# Patient Record
Sex: FEMALE | Race: WHITE | Employment: FULL TIME | URBAN - METROPOLITAN AREA
[De-identification: names, ages, dates, MRNs, and addresses within clinical notes are randomized per-mention and may not be internally consistent; named-entity substitution may affect disease eponyms.]

---

## 2018-10-03 PROBLEM — Z82.79 FAMILY HISTORY OF DOWN SYNDROME: Status: ACTIVE | Noted: 2018-10-03

## 2018-10-29 ENCOUNTER — HOSPITAL ENCOUNTER (EMERGENCY)
Age: 33
Discharge: HOME | End: 2018-10-30
Attending: EMERGENCY MEDICINE | Admitting: EMERGENCY MEDICINE
Payer: COMMERCIAL

## 2018-10-29 ENCOUNTER — APPOINTMENT (OUTPATIENT)
Dept: ULTRASOUND IMAGING | Age: 33
End: 2018-10-29
Attending: EMERGENCY MEDICINE
Payer: COMMERCIAL

## 2018-10-29 DIAGNOSIS — O03.9 COMPLETE ABORTION: Primary | ICD-10-CM

## 2018-10-29 LAB
ANION GAP SERPL CALC-SCNC: 12 MMOL/L (ref 3–18)
BASOPHILS # BLD: 0 K/UL (ref 0–0.1)
BASOPHILS NFR BLD: 0 % (ref 0–2)
BUN SERPL-MCNC: 10 MG/DL (ref 7–18)
BUN/CREAT SERPL: 17
CALCIUM SERPL-MCNC: 9 MG/DL (ref 8.5–10.1)
CHLORIDE SERPL-SCNC: 103 MMOL/L (ref 100–108)
CO2 SERPL-SCNC: 24 MMOL/L (ref 21–32)
CREAT SERPL-MCNC: 0.6 MG/DL (ref 0.6–1.3)
DIFFERENTIAL METHOD BLD: NORMAL
EOSINOPHIL # BLD: 0.2 K/UL (ref 0–0.4)
EOSINOPHIL NFR BLD: 2 % (ref 0–5)
ERYTHROCYTE [DISTWIDTH] IN BLOOD BY AUTOMATED COUNT: 12.3 % (ref 11.6–14.5)
GLUCOSE SERPL-MCNC: 105 MG/DL (ref 74–99)
HCG SERPL-ACNC: ABNORMAL MIU/ML (ref 0–10)
HCT VFR BLD AUTO: 43.3 % (ref 35–45)
HGB BLD-MCNC: 14.6 G/DL (ref 12–16)
LYMPHOCYTES # BLD: 2.6 K/UL (ref 0.9–3.6)
LYMPHOCYTES NFR BLD: 30 % (ref 21–52)
MCH RBC QN AUTO: 29.5 PG (ref 24–34)
MCHC RBC AUTO-ENTMCNC: 33.7 G/DL (ref 31–37)
MCV RBC AUTO: 87.5 FL (ref 74–97)
MONOCYTES # BLD: 0.7 K/UL (ref 0.05–1.2)
MONOCYTES NFR BLD: 8 % (ref 3–10)
NEUTS SEG # BLD: 5.3 K/UL (ref 1.8–8)
NEUTS SEG NFR BLD: 60 % (ref 40–73)
PLATELET # BLD AUTO: 204 K/UL (ref 135–420)
PMV BLD AUTO: 10.9 FL (ref 9.2–11.8)
POTASSIUM SERPL-SCNC: 3.8 MMOL/L (ref 3.5–5.5)
RBC # BLD AUTO: 4.95 M/UL (ref 4.2–5.3)
SODIUM SERPL-SCNC: 139 MMOL/L (ref 136–145)
WBC # BLD AUTO: 8.8 K/UL (ref 4.6–13.2)

## 2018-10-29 PROCEDURE — 80048 BASIC METABOLIC PNL TOTAL CA: CPT | Performed by: EMERGENCY MEDICINE

## 2018-10-29 PROCEDURE — 99285 EMERGENCY DEPT VISIT HI MDM: CPT

## 2018-10-29 PROCEDURE — 96375 TX/PRO/DX INJ NEW DRUG ADDON: CPT

## 2018-10-29 PROCEDURE — 74011250637 HC RX REV CODE- 250/637: Performed by: EMERGENCY MEDICINE

## 2018-10-29 PROCEDURE — 96374 THER/PROPH/DIAG INJ IV PUSH: CPT

## 2018-10-29 PROCEDURE — 74011250636 HC RX REV CODE- 250/636: Performed by: EMERGENCY MEDICINE

## 2018-10-29 PROCEDURE — 96361 HYDRATE IV INFUSION ADD-ON: CPT

## 2018-10-29 PROCEDURE — 84702 CHORIONIC GONADOTROPIN TEST: CPT | Performed by: EMERGENCY MEDICINE

## 2018-10-29 PROCEDURE — 93976 VASCULAR STUDY: CPT

## 2018-10-29 PROCEDURE — 85025 COMPLETE CBC W/AUTO DIFF WBC: CPT | Performed by: EMERGENCY MEDICINE

## 2018-10-29 PROCEDURE — 88305 TISSUE EXAM BY PATHOLOGIST: CPT | Performed by: OBSTETRICS & GYNECOLOGY

## 2018-10-29 RX ORDER — ONDANSETRON 2 MG/ML
4 INJECTION INTRAMUSCULAR; INTRAVENOUS
Status: COMPLETED | OUTPATIENT
Start: 2018-10-29 | End: 2018-10-29

## 2018-10-29 RX ORDER — KETOROLAC TROMETHAMINE 10 MG/1
10 TABLET, FILM COATED ORAL EVERY 8 HOURS
Qty: 15 TAB | Refills: 0 | Status: SHIPPED | OUTPATIENT
Start: 2018-10-29 | End: 2018-11-03

## 2018-10-29 RX ORDER — METHYLERGONOVINE MALEATE 0.2 MG/1
0.2 TABLET ORAL EVERY 6 HOURS
Qty: 4 TAB | Refills: 0 | Status: SHIPPED | OUTPATIENT
Start: 2018-10-29 | End: 2019-10-14

## 2018-10-29 RX ORDER — OXYCODONE AND ACETAMINOPHEN 5; 325 MG/1; MG/1
TABLET ORAL
Qty: 12 TAB | Refills: 0 | Status: SHIPPED | OUTPATIENT
Start: 2018-10-29 | End: 2018-10-29

## 2018-10-29 RX ORDER — MORPHINE SULFATE 4 MG/ML
4 INJECTION INTRAVENOUS
Status: COMPLETED | OUTPATIENT
Start: 2018-10-29 | End: 2018-10-29

## 2018-10-29 RX ORDER — MISOPROSTOL 100 UG/1
600 TABLET ORAL ONCE
Status: COMPLETED | OUTPATIENT
Start: 2018-10-29 | End: 2018-10-29

## 2018-10-29 RX ADMIN — SODIUM CHLORIDE 1000 ML: 900 INJECTION, SOLUTION INTRAVENOUS at 22:39

## 2018-10-29 RX ADMIN — ONDANSETRON 4 MG: 2 INJECTION INTRAMUSCULAR; INTRAVENOUS at 20:12

## 2018-10-29 RX ADMIN — MORPHINE SULFATE 4 MG: 4 INJECTION INTRAVENOUS at 20:12

## 2018-10-29 RX ADMIN — MISOPROSTOL 600 MCG: 100 TABLET ORAL at 23:12

## 2018-10-29 RX ADMIN — SODIUM CHLORIDE 1000 ML: 900 INJECTION, SOLUTION INTRAVENOUS at 20:14

## 2018-10-29 NOTE — ED TRIAGE NOTES
Pt arrives ambulatory to ED with c\o increased pain from miscarriage, pt sts she is scheduled for [facility]  tomorrow

## 2018-10-29 NOTE — ED PROVIDER NOTES
EMERGENCY DEPARTMENT HISTORY AND PHYSICAL EXAM 
 
Date: 10/29/2018 Patient Name: Darci Melara History of Presenting Illness Chief Complaint Patient presents with  Miscarriage History Provided By: Patient Chief Complaint: Abdominal Pain Duration: 4 Days Timing:  Worsening Location: Lower Quality: Aching and Cramping Severity: 10 out of 10 Modifying Factors: No modifying factors Associated Symptoms: vaginal bleeding, nausea Additional History (Context):  
7:43 PM 
Darci Melara is a 27 y.o. female with PMHX of miscarriage who presents to the emergency department C/O lower abdominal pain onset 4 hours ago. Associated sxs include vaginal bleeding, nausea. Pt states that she is scheduled for a D&C tomorrow with Dr. Michelle Jimenez. Pt states that 5 days ago she was dx with a miscarriage. Pt denies vomiting, fever, chills, and any other sxs or complaints. PCP: PHILIPP Mendez Current Facility-Administered Medications Medication Dose Route Frequency Provider Last Rate Last Dose  sodium chloride 0.9 % bolus infusion 1,000 mL  1,000 mL IntraVENous ONCE Vic Saini MD 1,000 mL/hr at 10/29/18 2239 1,000 mL at 10/29/18 2239 Current Outpatient Medications Medication Sig Dispense Refill  methylergonovine (METHERGINE) 0.2 mg tablet Take 1 Tab by mouth every six (6) hours. 4 Tab 0  
 oxyCODONE-acetaminophen (PERCOCET) 5-325 mg per tablet Take 1 tablet every 4-6 hours as needed for pain control. If you were instructed to try over the counter ibuprofen or tylenol, only take the percocet for pain not controlled with the over the counter medication. 12 Tab 0  
 ascorbate calcium (VITAMIN C PO) Take  by mouth.     
 benzonatate (TESSALON) 100 mg capsule TAKE 1 CAPSULE BY MOUTH THREE TIMES A DAY AS NEEDED FOR COUGH  0  
 nitrofurantoin, macrocrystal-monohydrate, (MACROBID) 100 mg capsule TAKE 1 CAPSULE BY MOUTH EVERY 12 HOURS WITH FOOD  0  
  Cetirizine (ZYRTEC) 10 mg cap Take  by mouth.  ibuprofen (MOTRIN) 800 mg tablet Take 1 Tab by mouth every eight (8) hours as needed (Pain level 4-6). 30 Tab 1  
 PRENATAL VIT37/IRON/FOLIC ACID (PRENATA PO) Take  by mouth. Past History Past Medical History: 
Past Medical History:  
Diagnosis Date  Miscarriage Past Surgical History: 
Past Surgical History:  
Procedure Laterality Date  HX  SECTION   Family History: 
Family History Problem Relation Age of Onset  Diabetes Other  Breast Cancer Paternal Aunt Social History: 
Social History Tobacco Use  Smoking status: Never Smoker  Smokeless tobacco: Never Used Substance Use Topics  Alcohol use: No  
 Drug use: No  
 
 
Allergies: Allergies Allergen Reactions  Latex, Natural Rubber Hives  Codeine Unknown (comments) Unsure projectile vomit or hives  Sulfa (Sulfonamide Antibiotics) Other (comments) Unknown projectile vomiting or hives Review of Systems Review of Systems Constitutional: Negative for chills and fever. Gastrointestinal: Positive for abdominal pain and nausea. Negative for vomiting. Genitourinary: Positive for vaginal bleeding. All other systems reviewed and are negative. Physical Exam  
 
Vitals:  
 10/29/18 2045 10/29/18 2100 10/29/18 2115 10/29/18 2130 BP: 121/59 127/65 128/62 121/66 Pulse: 78 89 88 79 Resp: 16 17 18 20 Temp:      
SpO2: 99% 100% 99% 100% Weight:      
Height:      
 
Physical Exam  
Constitutional: She is oriented to person, place, and time. She appears well-developed and well-nourished. Appears in pain HENT:  
Head: Normocephalic and atraumatic. Eyes: Pupils are equal, round, and reactive to light. Neck: Neck supple. Cardiovascular: Normal rate, regular rhythm, S1 normal, S2 normal and normal heart sounds. Pulmonary/Chest: Breath sounds normal. No respiratory distress.  She has no wheezes. She has no rales. She exhibits no tenderness. Abdominal: Soft. She exhibits no distension and no mass. There is no tenderness. There is no guarding. Musculoskeletal: Normal range of motion. She exhibits no edema or tenderness. Neurological: She is alert and oriented to person, place, and time. No cranial nerve deficit. Skin: No rash noted. Psychiatric: She has a normal mood and affect. Her behavior is normal. Thought content normal.  
Nursing note and vitals reviewed. Diagnostic Study Results Labs - Recent Results (from the past 12 hour(s)) CBC WITH AUTOMATED DIFF Collection Time: 10/29/18  7:45 PM  
Result Value Ref Range WBC 8.8 4.6 - 13.2 K/uL  
 RBC 4.95 4.20 - 5.30 M/uL  
 HGB 14.6 12.0 - 16.0 g/dL HCT 43.3 35.0 - 45.0 % MCV 87.5 74.0 - 97.0 FL  
 MCH 29.5 24.0 - 34.0 PG  
 MCHC 33.7 31.0 - 37.0 g/dL  
 RDW 12.3 11.6 - 14.5 % PLATELET 682 083 - 759 K/uL MPV 10.9 9.2 - 11.8 FL  
 NEUTROPHILS 60 40 - 73 % LYMPHOCYTES 30 21 - 52 % MONOCYTES 8 3 - 10 % EOSINOPHILS 2 0 - 5 % BASOPHILS 0 0 - 2 %  
 ABS. NEUTROPHILS 5.3 1.8 - 8.0 K/UL  
 ABS. LYMPHOCYTES 2.6 0.9 - 3.6 K/UL  
 ABS. MONOCYTES 0.7 0.05 - 1.2 K/UL  
 ABS. EOSINOPHILS 0.2 0.0 - 0.4 K/UL  
 ABS. BASOPHILS 0.0 0.0 - 0.1 K/UL  
 DF AUTOMATED METABOLIC PANEL, BASIC Collection Time: 10/29/18  7:45 PM  
Result Value Ref Range Sodium 139 136 - 145 mmol/L Potassium 3.8 3.5 - 5.5 mmol/L Chloride 103 100 - 108 mmol/L  
 CO2 24 21 - 32 mmol/L Anion gap 12 3.0 - 18 mmol/L Glucose 105 (H) 74 - 99 mg/dL BUN 10 7.0 - 18 MG/DL Creatinine 0.60 0.6 - 1.3 MG/DL  
 BUN/Creatinine ratio 17 GFR est AA >60 >60 ml/min/1.73m2 GFR est non-AA >60 >60 ml/min/1.73m2 Calcium 9.0 8.5 - 10.1 MG/DL  
BETA HCG, QT Collection Time: 10/29/18  7:45 PM  
Result Value Ref Range Beta HCG, QT 12,286 (H) 0 - 10 MIU/ML Radiologic Studies -  
US UTS TRANSVAGINAL OB Final Result IMPRESSION: 
 
No intrauterine gestation seen. Differential diagnoses includes early gestation 
versus blighted ovum. No torsion This is a pregnancy of unknown location. Correlate with beta-hCG and follow-up 
with ultrasound as clinically indicated. CT Results  (Last 48 hours) None CXR Results  (Last 48 hours) None Medications given in the ED- Medications  
sodium chloride 0.9 % bolus infusion 1,000 mL (1,000 mL IntraVENous New Bag 10/29/18 2239)  
sodium chloride 0.9 % bolus infusion 1,000 mL (1,000 mL IntraVENous New Bag 10/29/18 2014)  
ondansetron (ZOFRAN) injection 4 mg (4 mg IntraVENous Given 10/29/18 2012) morphine injection 4 mg (4 mg IntraVENous Given 10/29/18 2012) miSOPROStol (CYTOTEC) tablet 600 mcg (600 mcg Oral Given 10/29/18 2312) Medical Decision Making I am the first provider for this patient. I reviewed the vital signs, available nursing notes, past medical history, past surgical history, family history and social history. Vital Signs-Reviewed the patient's vital signs. Pulse Oximetry Analysis - 100% on RA Records Reviewed: Nursing Notes Provider Notes (Medical Decision Making):  
 
Procedures: 
Pelvic Exam 
Date/Time: 10/29/2018 9:15 PM 
Procedure duration:  15 minutes. Documented by:  Andra Mejias ED Scribe. As dictated by:  Naresh Zavala MD 
Exam assisted by:  Anthony Shaw female RN. Type of exam performed: bimanual and speculum. External genitalia appearance: normal.   
Vaginal exam:  bleeding. Post-procedure bleedin cc. Cervical exam:  os open and tissue seen from os. Bimanual exam:  uterine tenderness. Patient tolerance: Patient tolerated the procedure well with no immediate complications ED Course:  
7:43 PM Initial assessment performed.  The patients presenting problems have been discussed, and they are in agreement with the care plan formulated and outlined with them. I have encouraged them to ask questions as they arise throughout their visit. 9:18 PM Pt was sitting in commode and felt like tissue coming out there was some product of conception in the commode and on pelvic exam 20 cc blood in the vault and some tissue at the cervical os which was easily delivered and cervix was open about 2 cm. The uterus palpated normal size mild tenderness. 11:04 PM Discussed patient's history, exam, and available diagnostics results with Dr. Shelley Alfonso, Ob/Gyn, who agree with discharge her with Methergine and  will follow up with her tomorrow. Diagnosis and Disposition DISCHARGE NOTE: 
11:15 PM 
Rey Valente  results have been reviewed with her. She has been counseled regarding her diagnosis, treatment, and plan. She verbally conveys understanding and agreement of the signs, symptoms, diagnosis, treatment and prognosis and additionally agrees to follow up as discussed. She also agrees with the care-plan and conveys that all of her questions have been answered. I have also provided discharge instructions for her that include: educational information regarding their diagnosis and treatment, and list of reasons why they would want to return to the ED prior to their follow-up appointment, should her condition change. She has been provided with education for proper emergency department utilization. CLINICAL IMPRESSION: 
 
1. Complete  PLAN: 
1. D/C Home 2. Current Discharge Medication List  
  
START taking these medications Details  
methylergonovine (METHERGINE) 0.2 mg tablet Take 1 Tab by mouth every six (6) hours. Qty: 4 Tab, Refills: 0  
  
oxyCODONE-acetaminophen (PERCOCET) 5-325 mg per tablet Take 1 tablet every 4-6 hours as needed for pain control.   If you were instructed to try over the counter ibuprofen or tylenol, only take the percocet for pain not controlled with the over the counter medication. Qty: 12 Tab, Refills: 0 Associated Diagnoses: Complete  3. Follow-up Information Follow up With Specialties Details Why Contact Info Anna Gil MD Obstetrics & Gynecology, Gynecology, Obstetrics Schedule an appointment as soon as possible for a visit in 1 day For follow up with [address] [place] [address] [address] 
[phone] [facility] EMERGENCY DE[facility] Emergency Medicine Go to As needed, if symptoms worsen [address] [address] 
[phone]  
  
 
_______________________________ Attestations: This note is prepared by NATHAN PUCKETT MEM [facility], acting as Scribe for [employer], MD. [employer], MD:  The scribe's documentation has been prepared under my direction and personally reviewed by me in its entirety. I confirm that the note above accurately reflects all work, treatment, procedures, and medical decision making performed by me. 
_______________________________

## 2018-10-30 VITALS
OXYGEN SATURATION: 100 % | BODY MASS INDEX: 27.66 KG/M2 | TEMPERATURE: 97.9 F | RESPIRATION RATE: 19 BRPM | HEART RATE: 81 BPM | DIASTOLIC BLOOD PRESSURE: 71 MMHG | HEIGHT: 65 IN | SYSTOLIC BLOOD PRESSURE: 124 MMHG | WEIGHT: 166 LBS

## 2018-10-30 PROBLEM — O02.1 MISSED AB: Status: ACTIVE | Noted: 2018-10-30

## 2018-10-30 NOTE — ED NOTES
I have reviewed discharge instructions with the patient. The patient and spouse verbalized understanding. I have reviewed the provider's instructions with the patient, answering all questions to her satisfaction. Patient armband removed and shredded. Pt removed all belongings and ambulated without distress or discomfort.

## 2018-10-30 NOTE — DISCHARGE INSTRUCTIONS
Miscarriage: Care Instructions  Your Care Instructions    The loss of a pregnancy can be very hard. You may wonder why it happened or blame yourself. Miscarriages are common and are not caused by exercise, stress, or sex. Most happen because the fertilized egg in the uterus does not develop normally. There is no treatment that can stop a miscarriage. As long as you do not have heavy blood loss, fever, weakness, or other signs of infection, you can let a miscarriage follow its own course. This can take several days. Your body will recover over the next several weeks. Having a miscarriage does not mean you cannot have a normal pregnancy in the future. The doctor has checked you carefully, but problems can develop later. If you notice any problems or new symptoms, get medical treatment right away. Follow-up care is a key part of your treatment and safety. Be sure to make and go to all appointments, and call your doctor if you are having problems. It's also a good idea to know your test results and keep a list of the medicines you take. How can you care for yourself at home? · You will probably have some vaginal bleeding for 1 to 2 weeks. It may be similar to or slightly heavier than a normal period. The bleeding should get lighter after a week. Use pads instead of tampons. You may use tampons during your next period, which should start in 3 to 6 weeks. · Take an over-the-counter pain medicine, such as acetaminophen (Tylenol), ibuprofen (Advil, Motrin), or naproxen (Aleve) for cramps. Read and follow all instructions on the label. You may have cramps for several days after the miscarriage. · Do not take two or more pain medicines at the same time unless the doctor told you to. Many pain medicines have acetaminophen, which is Tylenol. Too much acetaminophen (Tylenol) can be harmful. · Use a clear container to save any tissue that you pass. Take it to your doctor's office as soon as you can.   · Do not have sex until the bleeding stops. · You may return to your normal activities if you feel well enough to do so. But you should avoid heavy exercise until the bleeding stops. · If you plan to get pregnant again, check with your doctor. Most doctors suggest waiting until you have had at least one normal period before you try to get pregnant. · If you do not want to get pregnant, ask your doctor about birth control. You can get pregnant again before your next period starts if you are not using birth control. · You may be low in iron because of blood loss. Eat a balanced diet that is high in iron and vitamin C. Foods rich in iron include red meat, shellfish, eggs, beans, and leafy green vegetables. Foods high in vitamin C include citrus fruits, tomatoes, and broccoli. Talk to your doctor about whether you need to take iron pills or a multivitamin. · The loss of a pregnancy can be very hard. You may wonder why it happened and blame yourself. Talking to family members, friends, a counselor, or your doctor may help you cope with your loss. When should you call for help? Call 911 anytime you think you may need emergency care. For example, call if:    · You passed out (lost consciousness).    Call your doctor now or seek immediate medical care if:    · You have severe vaginal bleeding.     · You are dizzy or lightheaded, or you feel like you may faint.     · You have new or worse pain in your belly or pelvis.     · You have a fever.     · You have vaginal discharge that smells bad.    Watch closely for changes in your health, and be sure to contact your doctor if:    · You do not get better as expected. Where can you learn more? Go to http://gopi-reji.info/. Enter E802 in the search box to learn more about \"Miscarriage: Care Instructions. \"  Current as of: November 21, 2017  Content Version: 11.8  © 3168-7687 [employer], [employer].  Care instructions adapted under license by [employer] (which disclaims liability or warranty for this information). If you have questions about a medical condition or this instruction, always ask your healthcare professional. [address] any warranty or liability for your use of this information.

## 2019-03-28 PROBLEM — O02.1 MISSED AB: Status: RESOLVED | Noted: 2018-10-30 | Resolved: 2019-03-28

## 2019-04-11 PROBLEM — O09.291 HISTORY OF MACROSOMIA IN INFANT IN PRIOR PREGNANCY, CURRENTLY PREGNANT IN FIRST TRIMESTER: Status: ACTIVE | Noted: 2019-04-11

## 2019-10-09 NOTE — H&P (VIEW-ONLY)
Ms. Erum Fan is a [id number]  36w5d with Estimated Date of Delivery: 19 presents to the office for a routine prenatal visit. Denies vaginal bleeding, loss of fluids, contractions, pre-eclampsia symptoms. She reports good fetal movement. Review of Systems - Psychological ROS: negative Cardiovascular ROS: no chest pain or dyspnea on exertion Gastrointestinal ROS: no abdominal pain, change in bowel habits, or black or bloody stools Genito-Urinary ROS: no dysuria, trouble voiding, or hematuria Neurological ROS: negative for - dizziness or headaches Visit Vitals /77 Pulse 99 Ht 5' 5.5\" (1.664 m) Wt 190 lb (86.2 kg) LMP 2019 (Exact Date) BMI 31.14 kg/m² SEE PRENATAL VITALS 
 
SVE: 0cm/50%/-2 A/P 
 
  ICD-10-CM ICD-9-CM 1. Supervision of high risk pregnancy in third trimester O09.93 V23.9 2. AMA (advanced maternal age) multigravida 33+, third trimester O09.523 65.56   
3. History of  section, low transverse Z98.891 V45.89   
4. History of macrosomia in infant in prior pregnancy, currently pregnant in third trimester O09.293 V23.49 5. Preop examination Z01.818 V72.84 GBS culture collected today. Repeat  section scheduled 10/25/2019 with Dr. Florentin Henson. Shoulder dystocia and circumcision consents reviewed and signed today. Reviewed s/sx of pre-term labor and pre-eclampsia. Encouraged daily FKC's. Follow-up and Dispositions · Return in about 1 week (around 10/16/2019), or if symptoms worsen or fail to improve, for SANIA. History & Physical 
 
Name: Erum Fan MRN: [id number]  SSN: [id number] YOB: 1985  Age: 26 y.o. Sex: female Subjective:  
 
Estimated Date of Delivery: 19 OB History  Para Term  AB Living 3 1 1   1 1 SAB TAB Ectopic Molar Multiple Live Births 1       0 1 # Outcome Date GA Lbr Luis Felipe/2nd Weight Sex Delivery Anes PTL Lv  
3 Current 2 Term 13 40w0d  10 lb 11 oz (4.849 kg) M  LO EPIDURAL AN N ANGELA  
1 SAB Ms. Valeria Augustin admitted with pregnancy at 44 weeks for  section due to previous  section. Prenatal course was complicated by advanced maternal age. Please see prenatal records for details. Past Medical History:  
Diagnosis Date  Miscarriage Past Surgical History:  
Procedure Laterality Date  HX  SECTION   Social History Occupational History  Not on file Tobacco Use  Smoking status: Never Smoker  Smokeless tobacco: Never Used Substance and Sexual Activity  Alcohol use: No  
 Drug use: No  
 Sexual activity: Yes  
  Partners: Male Birth control/protection: None Family History Problem Relation Age of Onset  Diabetes Other  Breast Cancer Paternal Aunt Allergies Allergen Reactions  Latex, Natural Rubber Hives  Codeine Unknown (comments) Unsure projectile vomit or hives  Sulfa (Sulfonamide Antibiotics) Other (comments) Unknown projectile vomiting or hives Prior to Admission medications Medication Sig Start Date End Date Taking? Authorizing Provider  
ascorbate calcium (VITAMIN C PO) Take  by mouth. Yes Provider, Historical  
Cetirizine (ZYRTEC) 10 mg cap Take  by mouth. Yes Provider, Historical  
PRENATAL VIT37/IRON/FOLIC ACID (PRENATA PO) Take  by mouth. Yes Provider, Historical  
amoxicillin (AMOXIL) 125 mg/5 mL suspension Take  by mouth three (3) times daily. Provider, Historical  
amoxicillin (AMOXIL) 875 mg tablet TAKE 1 TABLET BY MOUTH TWICE DAILY 19   Provider, Historical  
loratadine (CLARITIN) 10 mg tablet Take 10 mg by mouth. Provider, Historical  
methylergonovine (METHERGINE) 0.2 mg tablet Take 1 Tab by mouth every six (6) hours.  10/29/18   Vic Saini MD  
benzonatate (TESSALON) 100 mg capsule TAKE 1 CAPSULE BY MOUTH THREE TIMES A DAY AS NEEDED FOR COUGH 18   Provider, Historical  
nitrofurantoin, macrocrystal-monohydrate, (MACROBID) 100 mg capsule TAKE 1 CAPSULE BY MOUTH EVERY 12 HOURS WITH FOOD 18   Provider, Historical  
ibuprofen (MOTRIN) 800 mg tablet Take 1 Tab by mouth every eight (8) hours as needed (Pain level 4-6). 13   Neel Mathew MD  
  
 
Review of Systems: Pertinent items are noted in the History of Present Illness. Objective:  
 
Vitals: 
Vitals:  
 10/09/19 1005 BP: 115/77 Pulse: 99 Weight: 190 lb (86.2 kg) Height: 5' 5.5\" (1.664 m) Physical Exam: 
Patient without distress. Heart: Regular rate and rhythm Lung: clear to auscultation throughout lung fields, no wheezes, no rales, no rhonchi and normal respiratory effort Abdomen: soft, nontender Cervical Exam: 0 cm dilated Lower Extremities:  - Edema No 
Membranes:  Intact Fetal Heart Rate: present Prenatal Labs:  
Lab Results Component Value Date/Time ABO/Rh(D) O POS 2013 06:57 AM  
 Rubella, External imm 10/02/2012 GrBStrep, External pos 2013 HBsAg, External neg 10/02/2012 RPR, External nr 10/02/2012 Gonorrhea, External neg 10/02/2012 Chlamydia, External neg 10/02/2012 ABO,Rh O pos 10/02/2012 Impression/Plan:  
 
Plan:  Admit for  section. Group B Strep was not tested. Discussed the risks of surgery including the risks of bleeding, infection, deep vein thrombosis, and surgical injuries to internal organs including but not limited to the bowels, bladder, rectum, and female reproductive organs. The patient understands the risks; any and all questions were answered to the patient's satisfaction. The patient  was given information on preparation for surgery and post operative instructions. The operative consents were reviewed and signed with Álvaro Maharaj RN present for the discussion. NPO after midnight. Signed By:  Christian Christy MD   
 2019

## 2019-10-18 ENCOUNTER — ANESTHESIA (OUTPATIENT)
Dept: LABOR AND DELIVERY | Age: 34
End: 2019-10-18
Payer: COMMERCIAL

## 2019-10-18 ENCOUNTER — ANESTHESIA EVENT (OUTPATIENT)
Dept: LABOR AND DELIVERY | Age: 34
End: 2019-10-18
Payer: COMMERCIAL

## 2019-10-18 ENCOUNTER — HOSPITAL ENCOUNTER (INPATIENT)
Age: 34
LOS: 3 days | Discharge: HOME | End: 2019-10-21
Attending: OBSTETRICS & GYNECOLOGY | Admitting: OBSTETRICS & GYNECOLOGY
Payer: COMMERCIAL

## 2019-10-18 DIAGNOSIS — Z98.891 HISTORY OF CESAREAN SECTION, LOW TRANSVERSE: Primary | ICD-10-CM

## 2019-10-18 PROBLEM — Z33.1 IUP (INTRAUTERINE PREGNANCY), INCIDENTAL: Status: ACTIVE | Noted: 2019-10-18

## 2019-10-18 PROBLEM — O34.219 HISTORY OF CESAREAN DELIVERY, CURRENTLY PREGNANT: Status: ACTIVE | Noted: 2019-10-18

## 2019-10-18 LAB
ABO + RH BLD: NORMAL
BASOPHILS # BLD: 0 K/UL (ref 0–0.1)
BASOPHILS NFR BLD: 0 % (ref 0–2)
BLOOD GROUP ANTIBODIES SERPL: NORMAL
DIFFERENTIAL METHOD BLD: ABNORMAL
EOSINOPHIL # BLD: 0.1 K/UL (ref 0–0.4)
EOSINOPHIL NFR BLD: 1 % (ref 0–5)
ERYTHROCYTE [DISTWIDTH] IN BLOOD BY AUTOMATED COUNT: 14 % (ref 11.6–14.5)
HCT VFR BLD AUTO: 39.7 % (ref 35–45)
HGB BLD-MCNC: 13.1 G/DL (ref 12–16)
LYMPHOCYTES # BLD: 2.7 K/UL (ref 0.9–3.6)
LYMPHOCYTES NFR BLD: 20 % (ref 21–52)
MCH RBC QN AUTO: 28.8 PG (ref 24–34)
MCHC RBC AUTO-ENTMCNC: 33 G/DL (ref 31–37)
MCV RBC AUTO: 87.3 FL (ref 74–97)
MONOCYTES # BLD: 1.4 K/UL (ref 0.05–1.2)
MONOCYTES NFR BLD: 10 % (ref 3–10)
NEUTS SEG # BLD: 9.3 K/UL (ref 1.8–8)
NEUTS SEG NFR BLD: 69 % (ref 40–73)
PLATELET # BLD AUTO: 195 K/UL (ref 135–420)
PMV BLD AUTO: 11.3 FL (ref 9.2–11.8)
RBC # BLD AUTO: 4.55 M/UL (ref 4.2–5.3)
SPECIMEN EXP DATE BLD: NORMAL
WBC # BLD AUTO: 13.5 K/UL (ref 4.6–13.2)

## 2019-10-18 PROCEDURE — 77030040361 HC SLV COMPR DVT MDII -B

## 2019-10-18 PROCEDURE — 74011250636 HC RX REV CODE- 250/636: Performed by: OBSTETRICS & GYNECOLOGY

## 2019-10-18 PROCEDURE — 77030010507 HC ADH SKN DERMBND J&J -B: Performed by: OBSTETRICS & GYNECOLOGY

## 2019-10-18 PROCEDURE — 77030040361 HC SLV COMPR DVT MDII -B: Performed by: OBSTETRICS & GYNECOLOGY

## 2019-10-18 PROCEDURE — 77030011265 HC ELECTRD BLD HEX COVD -A: Performed by: OBSTETRICS & GYNECOLOGY

## 2019-10-18 PROCEDURE — 77030018836 HC SOL IRR NACL ICUM -A: Performed by: OBSTETRICS & GYNECOLOGY

## 2019-10-18 PROCEDURE — 59025 FETAL NON-STRESS TEST: CPT

## 2019-10-18 PROCEDURE — 65270000029 HC RM PRIVATE

## 2019-10-18 PROCEDURE — 74011000250 HC RX REV CODE- 250: Performed by: ANESTHESIOLOGY

## 2019-10-18 PROCEDURE — 85025 COMPLETE CBC W/AUTO DIFF WBC: CPT

## 2019-10-18 PROCEDURE — 74011250636 HC RX REV CODE- 250/636: Performed by: ANESTHESIOLOGY

## 2019-10-18 PROCEDURE — 75410000002 HC LABOR FEE PER 1 HR

## 2019-10-18 PROCEDURE — 76060000034 HC ANESTHESIA 1.5 TO 2 HR: Performed by: OBSTETRICS & GYNECOLOGY

## 2019-10-18 PROCEDURE — 77030031139 HC SUT VCRL2 J&J -A: Performed by: OBSTETRICS & GYNECOLOGY

## 2019-10-18 PROCEDURE — 77030040830 HC CATH URETH FOL MDII -A

## 2019-10-18 PROCEDURE — 77030018809 HC RETRCTR ALXSO DISP AMR -B: Performed by: OBSTETRICS & GYNECOLOGY

## 2019-10-18 PROCEDURE — 75410000003 HC RECOV DEL/VAG/CSECN EA 0.5 HR: Performed by: OBSTETRICS & GYNECOLOGY

## 2019-10-18 PROCEDURE — 74011000250 HC RX REV CODE- 250: Performed by: NURSE ANESTHETIST, CERTIFIED REGISTERED

## 2019-10-18 PROCEDURE — 99282 EMERGENCY DEPT VISIT SF MDM: CPT

## 2019-10-18 PROCEDURE — 4A1HXCZ MONITORING OF PRODUCTS OF CONCEPTION, CARDIAC RATE, EXTERNAL APPROACH: ICD-10-PCS | Performed by: OBSTETRICS & GYNECOLOGY

## 2019-10-18 PROCEDURE — 77030009363 HC CUP VAC EXTRCT CNCI -B: Performed by: OBSTETRICS & GYNECOLOGY

## 2019-10-18 PROCEDURE — 77030027138 HC INCENT SPIROMETER -A

## 2019-10-18 PROCEDURE — 76010000392 HC C SECN EA ADDL 0.5 HR: Performed by: OBSTETRICS & GYNECOLOGY

## 2019-10-18 PROCEDURE — 86900 BLOOD TYPING SEROLOGIC ABO: CPT

## 2019-10-18 PROCEDURE — 77030008459 HC STPLR SKN COOP -B: Performed by: OBSTETRICS & GYNECOLOGY

## 2019-10-18 PROCEDURE — 75410000003 HC RECOV DEL/VAG/CSECN EA 0.5 HR

## 2019-10-18 PROCEDURE — 76010000391 HC C SECN FIRST 1 HR: Performed by: OBSTETRICS & GYNECOLOGY

## 2019-10-18 PROCEDURE — 74011250636 HC RX REV CODE- 250/636: Performed by: NURSE ANESTHETIST, CERTIFIED REGISTERED

## 2019-10-18 RX ORDER — SUCCINYLCHOLINE CHLORIDE 100 MG/5ML
SYRINGE (ML) INTRAVENOUS AS NEEDED
Status: DISCONTINUED | OUTPATIENT
Start: 2019-10-18 | End: 2019-10-18 | Stop reason: HOSPADM

## 2019-10-18 RX ORDER — FLUMAZENIL 0.1 MG/ML
0.2 INJECTION INTRAVENOUS
Status: CANCELLED | OUTPATIENT
Start: 2019-10-18

## 2019-10-18 RX ORDER — KETOROLAC TROMETHAMINE 30 MG/ML
30 INJECTION, SOLUTION INTRAMUSCULAR; INTRAVENOUS EVERY 6 HOURS
Status: CANCELLED | OUTPATIENT
Start: 2019-10-18 | End: 2019-10-20

## 2019-10-18 RX ORDER — BUPIVACAINE HYDROCHLORIDE 7.5 MG/ML
INJECTION, SOLUTION INTRASPINAL
Status: COMPLETED | OUTPATIENT
Start: 2019-10-18 | End: 2019-10-18

## 2019-10-18 RX ORDER — OXYTOCIN/RINGER'S LACTATE 20/1000 ML
PLASTIC BAG, INJECTION (ML) INTRAVENOUS AS NEEDED
Status: DISCONTINUED | OUTPATIENT
Start: 2019-10-18 | End: 2019-10-18 | Stop reason: HOSPADM

## 2019-10-18 RX ORDER — FENTANYL CITRATE 50 UG/ML
INJECTION, SOLUTION INTRAMUSCULAR; INTRAVENOUS AS NEEDED
Status: DISCONTINUED | OUTPATIENT
Start: 2019-10-18 | End: 2019-10-18 | Stop reason: HOSPADM

## 2019-10-18 RX ORDER — ACETAMINOPHEN 325 MG/1
650 TABLET ORAL
Status: CANCELLED | OUTPATIENT
Start: 2019-10-18

## 2019-10-18 RX ORDER — SODIUM CHLORIDE, SODIUM LACTATE, POTASSIUM CHLORIDE, CALCIUM CHLORIDE 600; 310; 30; 20 MG/100ML; MG/100ML; MG/100ML; MG/100ML
125 INJECTION, SOLUTION INTRAVENOUS CONTINUOUS
Status: DISPENSED | OUTPATIENT
Start: 2019-10-18 | End: 2019-10-19

## 2019-10-18 RX ORDER — SODIUM CHLORIDE 0.9 % (FLUSH) 0.9 %
5-40 SYRINGE (ML) INJECTION AS NEEDED
Status: CANCELLED | OUTPATIENT
Start: 2019-10-18

## 2019-10-18 RX ORDER — ONDANSETRON 2 MG/ML
4 INJECTION INTRAMUSCULAR; INTRAVENOUS
Status: DISCONTINUED | OUTPATIENT
Start: 2019-10-18 | End: 2019-10-21 | Stop reason: HOSPADM

## 2019-10-18 RX ORDER — NALOXONE HYDROCHLORIDE 0.4 MG/ML
0.1 INJECTION, SOLUTION INTRAMUSCULAR; INTRAVENOUS; SUBCUTANEOUS AS NEEDED
Status: CANCELLED | OUTPATIENT
Start: 2019-10-18

## 2019-10-18 RX ORDER — OXYTOCIN/0.9 % SODIUM CHLORIDE 20/1000 ML
PLASTIC BAG, INJECTION (ML) INTRAVENOUS
Status: DISPENSED
Start: 2019-10-18 | End: 2019-10-18

## 2019-10-18 RX ORDER — SODIUM CHLORIDE 0.9 % (FLUSH) 0.9 %
5-40 SYRINGE (ML) INJECTION EVERY 8 HOURS
Status: CANCELLED | OUTPATIENT
Start: 2019-10-18

## 2019-10-18 RX ORDER — MORPHINE SULFATE 0.5 MG/ML
INJECTION, SOLUTION EPIDURAL; INTRATHECAL; INTRAVENOUS
Status: COMPLETED | OUTPATIENT
Start: 2019-10-18 | End: 2019-10-18

## 2019-10-18 RX ORDER — FACIAL-BODY WIPES
10 EACH TOPICAL
Status: DISCONTINUED | OUTPATIENT
Start: 2019-10-18 | End: 2019-10-21 | Stop reason: HOSPADM

## 2019-10-18 RX ORDER — ZOLPIDEM TARTRATE 5 MG/1
5 TABLET ORAL
Status: CANCELLED | OUTPATIENT
Start: 2019-10-18

## 2019-10-18 RX ORDER — PROPOFOL 10 MG/ML
INJECTION, EMULSION INTRAVENOUS AS NEEDED
Status: DISCONTINUED | OUTPATIENT
Start: 2019-10-18 | End: 2019-10-18 | Stop reason: HOSPADM

## 2019-10-18 RX ORDER — ACETAMINOPHEN 325 MG/1
650 TABLET ORAL
Status: DISCONTINUED | OUTPATIENT
Start: 2019-10-18 | End: 2019-10-19

## 2019-10-18 RX ORDER — MORPHINE SULFATE 1 MG/ML
INJECTION, SOLUTION EPIDURAL; INTRATHECAL; INTRAVENOUS AS NEEDED
Status: DISCONTINUED | OUTPATIENT
Start: 2019-10-18 | End: 2019-10-18 | Stop reason: HOSPADM

## 2019-10-18 RX ORDER — KETOROLAC TROMETHAMINE 30 MG/ML
INJECTION, SOLUTION INTRAMUSCULAR; INTRAVENOUS AS NEEDED
Status: DISCONTINUED | OUTPATIENT
Start: 2019-10-18 | End: 2019-10-18 | Stop reason: HOSPADM

## 2019-10-18 RX ORDER — HYDROMORPHONE HYDROCHLORIDE 2 MG/ML
INJECTION, SOLUTION INTRAMUSCULAR; INTRAVENOUS; SUBCUTANEOUS AS NEEDED
Status: DISCONTINUED | OUTPATIENT
Start: 2019-10-18 | End: 2019-10-18 | Stop reason: HOSPADM

## 2019-10-18 RX ORDER — SIMETHICONE 80 MG
80 TABLET,CHEWABLE ORAL
Status: DISCONTINUED | OUTPATIENT
Start: 2019-10-18 | End: 2019-10-21 | Stop reason: HOSPADM

## 2019-10-18 RX ORDER — ONDANSETRON 2 MG/ML
INJECTION INTRAMUSCULAR; INTRAVENOUS AS NEEDED
Status: DISCONTINUED | OUTPATIENT
Start: 2019-10-18 | End: 2019-10-18 | Stop reason: HOSPADM

## 2019-10-18 RX ORDER — FACIAL-BODY WIPES
10 EACH TOPICAL
Status: CANCELLED | OUTPATIENT
Start: 2019-10-18

## 2019-10-18 RX ORDER — PROCHLORPERAZINE EDISYLATE 5 MG/ML
5 INJECTION INTRAMUSCULAR; INTRAVENOUS ONCE
Status: CANCELLED | OUTPATIENT
Start: 2019-10-18 | End: 2019-10-18

## 2019-10-18 RX ORDER — IBUPROFEN 400 MG/1
800 TABLET ORAL
Status: CANCELLED | OUTPATIENT
Start: 2019-10-21

## 2019-10-18 RX ORDER — SODIUM CHLORIDE, SODIUM LACTATE, POTASSIUM CHLORIDE, CALCIUM CHLORIDE 600; 310; 30; 20 MG/100ML; MG/100ML; MG/100ML; MG/100ML
125 INJECTION, SOLUTION INTRAVENOUS CONTINUOUS
Status: DISCONTINUED | OUTPATIENT
Start: 2019-10-18 | End: 2019-10-18 | Stop reason: HOSPADM

## 2019-10-18 RX ORDER — ZOLPIDEM TARTRATE 5 MG/1
5 TABLET ORAL
Status: DISCONTINUED | OUTPATIENT
Start: 2019-10-18 | End: 2019-10-21 | Stop reason: HOSPADM

## 2019-10-18 RX ORDER — PROMETHAZINE HYDROCHLORIDE 25 MG/ML
25 INJECTION, SOLUTION INTRAMUSCULAR; INTRAVENOUS
Status: CANCELLED | OUTPATIENT
Start: 2019-10-18

## 2019-10-18 RX ORDER — DIPHENHYDRAMINE HYDROCHLORIDE 50 MG/ML
12.5 INJECTION, SOLUTION INTRAMUSCULAR; INTRAVENOUS
Status: CANCELLED | OUTPATIENT
Start: 2019-10-18

## 2019-10-18 RX ORDER — PROMETHAZINE HYDROCHLORIDE 25 MG/ML
25 INJECTION, SOLUTION INTRAMUSCULAR; INTRAVENOUS
Status: DISCONTINUED | OUTPATIENT
Start: 2019-10-18 | End: 2019-10-21 | Stop reason: HOSPADM

## 2019-10-18 RX ORDER — OXYCODONE AND ACETAMINOPHEN 5; 325 MG/1; MG/1
1-2 TABLET ORAL
Status: DISCONTINUED | OUTPATIENT
Start: 2019-10-18 | End: 2019-10-19

## 2019-10-18 RX ORDER — FENTANYL CITRATE 50 UG/ML
25 INJECTION, SOLUTION INTRAMUSCULAR; INTRAVENOUS AS NEEDED
Status: CANCELLED | OUTPATIENT
Start: 2019-10-18

## 2019-10-18 RX ORDER — SODIUM CHLORIDE, SODIUM LACTATE, POTASSIUM CHLORIDE, CALCIUM CHLORIDE 600; 310; 30; 20 MG/100ML; MG/100ML; MG/100ML; MG/100ML
125 INJECTION, SOLUTION INTRAVENOUS CONTINUOUS
Status: CANCELLED | OUTPATIENT
Start: 2019-10-18 | End: 2019-10-19

## 2019-10-18 RX ORDER — IBUPROFEN 400 MG/1
800 TABLET ORAL
Status: DISCONTINUED | OUTPATIENT
Start: 2019-10-21 | End: 2019-10-20

## 2019-10-18 RX ORDER — DIPHENHYDRAMINE HYDROCHLORIDE 50 MG/ML
12.5 INJECTION, SOLUTION INTRAMUSCULAR; INTRAVENOUS
Status: DISCONTINUED | OUTPATIENT
Start: 2019-10-18 | End: 2019-10-21 | Stop reason: HOSPADM

## 2019-10-18 RX ORDER — HYDROMORPHONE HYDROCHLORIDE 2 MG/ML
0.5 INJECTION, SOLUTION INTRAMUSCULAR; INTRAVENOUS; SUBCUTANEOUS
Status: CANCELLED | OUTPATIENT
Start: 2019-10-18

## 2019-10-18 RX ORDER — CEFAZOLIN SODIUM/WATER 2 G/20 ML
2 SYRINGE (ML) INTRAVENOUS ONCE
Status: COMPLETED | OUTPATIENT
Start: 2019-10-18 | End: 2019-10-18

## 2019-10-18 RX ORDER — CEFAZOLIN SODIUM/WATER 2 G/20 ML
2 SYRINGE (ML) INTRAVENOUS ONCE
Status: DISCONTINUED | OUTPATIENT
Start: 2019-10-18 | End: 2019-10-18 | Stop reason: HOSPADM

## 2019-10-18 RX ORDER — SODIUM CHLORIDE 0.9 % (FLUSH) 0.9 %
5-40 SYRINGE (ML) INJECTION AS NEEDED
Status: DISCONTINUED | OUTPATIENT
Start: 2019-10-18 | End: 2019-10-21 | Stop reason: HOSPADM

## 2019-10-18 RX ORDER — SODIUM CHLORIDE, SODIUM LACTATE, POTASSIUM CHLORIDE, CALCIUM CHLORIDE 600; 310; 30; 20 MG/100ML; MG/100ML; MG/100ML; MG/100ML
75 INJECTION, SOLUTION INTRAVENOUS CONTINUOUS
Status: CANCELLED | OUTPATIENT
Start: 2019-10-18

## 2019-10-18 RX ORDER — FENTANYL CITRATE 50 UG/ML
INJECTION, SOLUTION INTRAMUSCULAR; INTRAVENOUS
Status: COMPLETED | OUTPATIENT
Start: 2019-10-18 | End: 2019-10-18

## 2019-10-18 RX ORDER — SIMETHICONE 80 MG
80 TABLET,CHEWABLE ORAL
Status: CANCELLED | OUTPATIENT
Start: 2019-10-18

## 2019-10-18 RX ORDER — HYDROMORPHONE HCL IN 0.9% NACL 30 MG/30ML
PATIENT CONTROLLED ANALGESIA SYRINGE INTRAVENOUS
Status: DISCONTINUED | OUTPATIENT
Start: 2019-10-18 | End: 2019-10-19

## 2019-10-18 RX ORDER — OXYTOCIN/RINGER'S LACTATE 20/1000 ML
125 PLASTIC BAG, INJECTION (ML) INTRAVENOUS CONTINUOUS
Status: CANCELLED | OUTPATIENT
Start: 2019-10-18

## 2019-10-18 RX ORDER — KETAMINE HCL IN 0.9 % NACL 50 MG/5 ML
SYRINGE (ML) INTRAVENOUS AS NEEDED
Status: DISCONTINUED | OUTPATIENT
Start: 2019-10-18 | End: 2019-10-18 | Stop reason: HOSPADM

## 2019-10-18 RX ORDER — KETOROLAC TROMETHAMINE 30 MG/ML
30 INJECTION, SOLUTION INTRAMUSCULAR; INTRAVENOUS EVERY 6 HOURS
Status: COMPLETED | OUTPATIENT
Start: 2019-10-18 | End: 2019-10-20

## 2019-10-18 RX ORDER — OXYTOCIN/0.9 % SODIUM CHLORIDE 20/1000 ML
125 PLASTIC BAG, INJECTION (ML) INTRAVENOUS CONTINUOUS
Status: DISCONTINUED | OUTPATIENT
Start: 2019-10-18 | End: 2019-10-21 | Stop reason: HOSPADM

## 2019-10-18 RX ADMIN — ONDANSETRON HYDROCHLORIDE 4 MG: 2 INJECTION INTRAMUSCULAR; INTRAVENOUS at 08:22

## 2019-10-18 RX ADMIN — Medication 50 MG: at 08:38

## 2019-10-18 RX ADMIN — FENTANYL CITRATE 85 MCG: 50 INJECTION, SOLUTION INTRAMUSCULAR; INTRAVENOUS at 09:25

## 2019-10-18 RX ADMIN — FENTANYL CITRATE 25 MCG: 50 INJECTION, SOLUTION INTRAMUSCULAR; INTRAVENOUS at 08:58

## 2019-10-18 RX ADMIN — FENTANYL CITRATE 15 MCG: 50 INJECTION, SOLUTION INTRAMUSCULAR; INTRAVENOUS at 08:15

## 2019-10-18 RX ADMIN — FENTANYL CITRATE 25 MCG: 50 INJECTION, SOLUTION INTRAMUSCULAR; INTRAVENOUS at 08:47

## 2019-10-18 RX ADMIN — SODIUM CHLORIDE, SODIUM LACTATE, POTASSIUM CHLORIDE, AND CALCIUM CHLORIDE 125 ML/HR: 600; 310; 30; 20 INJECTION, SOLUTION INTRAVENOUS at 21:11

## 2019-10-18 RX ADMIN — SODIUM CHLORIDE, SODIUM LACTATE, POTASSIUM CHLORIDE, AND CALCIUM CHLORIDE: 600; 310; 30; 20 INJECTION, SOLUTION INTRAVENOUS at 08:20

## 2019-10-18 RX ADMIN — HYDROMORPHONE HYDROCHLORIDE 2 MG: 2 INJECTION, SOLUTION INTRAMUSCULAR; INTRAVENOUS; SUBCUTANEOUS at 09:20

## 2019-10-18 RX ADMIN — Medication 250 ML: at 09:03

## 2019-10-18 RX ADMIN — KETOROLAC TROMETHAMINE 30 MG: 30 INJECTION, SOLUTION INTRAMUSCULAR at 18:41

## 2019-10-18 RX ADMIN — MORPHINE SULFATE 2 MG: 1 INJECTION, SOLUTION EPIDURAL; INTRATHECAL; INTRAVENOUS at 09:20

## 2019-10-18 RX ADMIN — Medication 2 G: at 08:20

## 2019-10-18 RX ADMIN — MORPHINE SULFATE 0.2 MG: 0.5 INJECTION EPIDURAL; INTRATHECAL; INTRAVENOUS at 08:15

## 2019-10-18 RX ADMIN — PROPOFOL 150 MG: 10 INJECTION, EMULSION INTRAVENOUS at 08:40

## 2019-10-18 RX ADMIN — Medication 100 MG: at 08:40

## 2019-10-18 RX ADMIN — FENTANYL CITRATE 40 MCG: 50 INJECTION, SOLUTION INTRAMUSCULAR; INTRAVENOUS at 09:11

## 2019-10-18 RX ADMIN — KETOROLAC TROMETHAMINE 30 MG: 30 INJECTION, SOLUTION INTRAMUSCULAR; INTRAVENOUS at 09:14

## 2019-10-18 RX ADMIN — Medication 250 ML: at 09:22

## 2019-10-18 RX ADMIN — MORPHINE SULFATE 2 MG: 0.5 INJECTION EPIDURAL; INTRATHECAL; INTRAVENOUS at 09:22

## 2019-10-18 RX ADMIN — Medication: at 09:43

## 2019-10-18 RX ADMIN — Medication 250 ML: at 08:45

## 2019-10-18 RX ADMIN — SODIUM CHLORIDE, SODIUM LACTATE, POTASSIUM CHLORIDE, AND CALCIUM CHLORIDE 125 ML/HR: 600; 310; 30; 20 INJECTION, SOLUTION INTRAVENOUS at 10:32

## 2019-10-18 RX ADMIN — BUPIVACAINE HYDROCHLORIDE IN DEXTROSE 12 MG: 7.5 INJECTION, SOLUTION SUBARACHNOID at 08:15

## 2019-10-18 NOTE — LACTATION NOTE
Attempted feeding, but infant unable to latch or able to suck on finger upon assessment. Hand expression education completed with mom, but unable to express at this time. Encouraged skin to skin and attempt again in an hour. Mom educated on breastfeeding basics--hunger cues, feeding on demand, waking baby if baby sleeps too long between feeds, importance of skin to skin, positioning and latching, risk of pacifier use and supplemental feedings, and importance of rooming in--and use of log sheet. Mom also educated on benefits of breastfeeding for herself and baby. Mom verbalized understanding. No questions at this time.

## 2019-10-18 NOTE — PROGRESS NOTES
Problem: Patient Education: Go to Patient Education Activity  Goal: Patient/Family Education  Outcome: Progressing Towards Goal     Problem:  Delivery: Day of Delivery  Goal: Off Pathway (Use only if patient is Off Pathway)  Outcome: Progressing Towards Goal  Goal: Activity/Safety  Outcome: Progressing Towards Goal  Goal: Consults, if ordered  Outcome: Progressing Towards Goal  Goal: Diagnostic Test/Procedures  Outcome: Progressing Towards Goal  Goal: Nutrition/Diet  Outcome: Progressing Towards Goal  Goal: Discharge Planning  Outcome: Progressing Towards Goal  Goal: Medications  Outcome: Progressing Towards Goal  Goal: Respiratory  Outcome: Progressing Towards Goal  Goal: Treatments/Interventions/Procedures  Outcome: Progressing Towards Goal  Goal: Psychosocial  Outcome: Progressing Towards Goal  Goal: *Vital signs within defined limits  Outcome: Progressing Towards Goal  Goal: *Labs within defined limits  Outcome: Progressing Towards Goal  Goal: *Hemodynamically stable  Outcome: Progressing Towards Goal  Goal: *Optimal pain control at patient's stated goal  Outcome: Progressing Towards Goal  Goal: *Participates in infant care  Outcome: Progressing Towards Goal  Goal: *Demonstrates progressive activity  Outcome: Progressing Towards Goal  Goal: *Tolerating diet  Outcome: Progressing Towards Goal     Problem: Pain  Goal: *Control of Pain  Outcome: Progressing Towards Goal  Goal: *PALLIATIVE CARE:  Alleviation of Pain  Outcome: Progressing Towards Goal     Problem: Patient Education: Go to Patient Education Activity  Goal: Patient/Family Education  Outcome: Progressing Towards Goal

## 2019-10-18 NOTE — ANESTHESIA PREPROCEDURE EVALUATION
Relevant Problems   No relevant active problems       Anesthetic History        Pertinent negatives: No history of awareness of surgery under anesthesia       Review of Systems / Medical History  Patient summary reviewed, nursing notes reviewed and pertinent labs reviewed    Pulmonary  Within defined limits                 Neuro/Psych         Psychiatric history     Cardiovascular  Within defined limits                     GI/Hepatic/Renal     GERD: well controlled           Endo/Other  Within defined limits           Other Findings              Physical Exam    Airway  Mallampati: II  TM Distance: 4 - 6 cm  Neck ROM: normal range of motion   Mouth opening: Normal    Comments: Small TM distance Cardiovascular    Rhythm: regular  Rate: normal         Dental  No notable dental hx       Pulmonary  Breath sounds clear to auscultation               Abdominal  GI exam deferred       Other Findings            Anesthetic Plan    ASA: 2  Anesthesia type: spinal            Anesthetic plan and risks discussed with: Patient and Family      Risks of spinal discussed with patient including nerve injury, infection, bleeding, headache, back pain, hypotension and failed block.

## 2019-10-18 NOTE — PROGRESS NOTES
Problem: Patient Education: Go to Patient Education Activity  Goal: Patient/Family Education  Outcome: Progressing Towards Goal     Problem:  Delivery: Day of Delivery  Goal: Activity/Safety  Outcome: Progressing Towards Goal     Problem:  Delivery: Day of Delivery  Goal: Consults, if ordered  Outcome: Progressing Towards Goal     Problem:  Delivery: Day of Delivery  Goal: Diagnostic Test/Procedures  Outcome: Progressing Towards Goal     Problem:  Delivery: Day of Delivery  Goal: Nutrition/Diet  Outcome: Progressing Towards Goal     Problem:  Delivery: Day of Delivery  Goal: Discharge Planning  Outcome: Progressing Towards Goal     Problem:  Delivery: Day of Delivery  Goal: Medications  Outcome: Progressing Towards Goal     Problem:  Delivery: Day of Delivery  Goal: Respiratory  Outcome: Progressing Towards Goal     Problem:  Delivery: Day of Delivery  Goal: Treatments/Interventions/Procedures  Outcome: Progressing Towards Goal     Problem:  Delivery: Day of Delivery  Goal: Psychosocial  Outcome: Progressing Towards Goal     Problem:  Delivery: Day of Delivery  Goal: *Labs within defined limits  Outcome: Progressing Towards Goal     Problem:  Delivery: Day of Delivery  Goal: *Hemodynamically stable  Outcome: Progressing Towards Goal     Problem:  Delivery: Day of Delivery  Goal: *Optimal pain control at patient's stated goal  Outcome: Progressing Towards Goal     Problem:  Delivery: Day of Delivery  Goal: *Demonstrates progressive activity  Outcome: Progressing Towards Goal     Problem:  Delivery: Day of Delivery  Goal: *Tolerating diet  Outcome: Progressing Towards Goal     Problem: Pain  Goal: *Control of Pain  Outcome: Progressing Towards Goal  Goal: *PALLIATIVE CARE:  Alleviation of Pain  Outcome: Progressing Towards Goal

## 2019-10-18 NOTE — ANESTHESIA POSTPROCEDURE EVALUATION
Procedure(s):  REPEAT  SECTION. spinal    Anesthesia Post Evaluation      Multimodal analgesia: multimodal analgesia used between 6 hours prior to anesthesia start to PACU discharge  Patient location during evaluation: PACU  Patient participation: complete - patient participated  Level of consciousness: awake and alert  Pain management: satisfactory to patient  Airway patency: patent  Anesthetic complications: no  Cardiovascular status: acceptable  Respiratory status: acceptable and nasal cannula  Hydration status: acceptable  Post anesthesia nausea and vomiting:  none      Vitals Value Taken Time   /78 10/18/2019 10:15 AM   Temp 36.1 °C (97 °F) 10/18/2019  9:30 AM   Pulse 88 10/18/2019 10:26 AM   Resp 16 10/18/2019 10:26 AM   SpO2 100 % 10/18/2019 10:26 AM   Vitals shown include unvalidated device data.

## 2019-10-18 NOTE — PROGRESS NOTES
0715: Bedside shift change report given to SHAUNA Palomares RN (oncoming nurse) by Lakesha Carbajal. Andrew Lobato RN (offgoing nurse). Report included the following information SBAR, Kardex, Procedure Summary, Intake/Output and Recent Results. 6296: Dr Grimes Person at bedside     1229: Pt in [address] [place] [address] for repeat  section. 7893: Decision made to put patient under general anesthesia. Pt verbalizes understanding. 3730: Viable male infant born. Cord clamped after 30 seconds. Dr Cele Coleman assessing baby. 3565: In PACU. Status report given to IRIS Parra RN    0930: Fundal check firm midline and at [address]. Small vag bleeding. 1040: Out of PACU and in room 3 L&D. Skin to skin initiated with mother and baby upon entering the room. SCDs on. PCA pump infusing. Pt oriented to room and call bell. FOB and sibling in room with pt    1130: Pt resting comfortably in bed. No complaints of pain at this time. 1230: Fundal check. Midline 1 below umbilicus scant rubra bleeding. 1410: Liz Care, pads changed. Pt reports 0/10 pain at this time. 1430: TRANSFER - OUT REPORT:    Verbal report given to Kacey Ortiz RN (name) on  Ruven Dubin  being transferred to Postpartum(unit) for routine post - op       Report consisted of patients Situation, Background, Assessment and   Recommendations(SBAR). Information from the following report(s) SBAR, Kardex, OR Summary, Procedure Summary, Intake/Output, MAR and Recent Results was reviewed with the receiving nurse. Lines:   Peripheral IV 10/18/19 Left;Posterior Hand (Active)   Site Assessment Clean, dry, & intact 10/18/2019 10:10 AM   Phlebitis Assessment 0 10/18/2019 10:10 AM   Infiltration Assessment 0 10/18/2019 10:10 AM   Dressing Status Clean, dry, & intact 10/18/2019 10:10 AM   Dressing Type Transparent 10/18/2019 10:10 AM   Hub Color/Line Status Pink; Infusing 10/18/2019 10:10 AM   Alcohol Cap Used Yes 10/18/2019  9:41 AM        Opportunity for questions and clarification was provided.       Patient transported with:   Registered Nurse

## 2019-10-18 NOTE — ADDENDUM NOTE
Addendum  created 10/18/19 1202 by Maria Elena Yeung MD    Actions taken from a BestPractice Advisory, Intraprocedure Flowsheets edited

## 2019-10-18 NOTE — ROUTINE PROCESS
Bedside and Verbal shift change report given to TK Beltran RN  by Lieutenant Suzie RN . Report given with Shaun VILLALTA and MAR.

## 2019-10-18 NOTE — INTERVAL H&P NOTE
H&P Update:  Ruthann Craig was seen and examined. History and physical has been reviewed. Significant clinical changes have occurred as noted:  Patient had SROM of clear fluid this morning at 0100, followed by contractions. We are proceeding today for repeat  section.

## 2019-10-18 NOTE — PERIOP NOTES
TRANSFER - IN REPORT:    Verbal report received from CRNA on BenKeeling Tawanna  being received from OR for routine post - op      Report consisted of patients Situation, Background, Assessment and   Recommendations(SBAR). Information from the following report(s) SBAR was reviewed with the receiving nurse. Opportunity for questions and clarification was provided. Assessment completed upon patients arrival to unit and care assumed. No concerns noted with dual skin assessment.

## 2019-10-18 NOTE — PROGRESS NOTES
Verbal report received from Alisa Barrera RN on Gilford Tawanna   being received from L&D for routine progression of care      Report consisted of patients Situation, Background, Assessment and   Recommendations(SBAR). Information from the following report(s) SBAR, Kardex, Procedure Summary and MAR was reviewed with the receiving nurse. Opportunity for questions and clarification was provided. Assessment completed upon patients arrival to unit and care assumed. Oriented to room and infant safety. Incentive spirometry given with instructions, patient demonstrates good understanding. SCDs and patient teaching on DVT prevention done. Instructed to call for excessive bleeding and clots. Discussed pain control and pain meds. Patient verbalized good understanding.

## 2019-10-18 NOTE — ANESTHESIA PROCEDURE NOTES
Spinal Block    Start time: 10/18/2019 8:11 AM  End time: 10/18/2019 8:13 AM  Performed by: Leopoldo Lory, CRNA  Authorized by: Karina Pearce MD     Pre-procedure: Indications: primary anesthetic  Preanesthetic Checklist: patient identified, risks and benefits discussed, anesthesia consent, site marked, patient being monitored and timeout performed    Timeout Time: 08:05          Spinal Block:   Patient Position:  Seated  Prep Region:  Lumbar  Prep: chlorhexidine      Location:  L3-4  Technique:  Single shot    Local Dose (mL):  1.6    Needle:   Needle Type:   Tamar  Needle Gauge:  25 G  Attempts:  3 (2 attempts by [employer], CRNA and 1 attempt by me)      Events: CSF confirmed, no blood with aspiration and paresthesia    Events comment:  Transient right sided parasthesia, resolved    Assessment:  Insertion:  Uncomplicated  Patient tolerance:  Patient tolerated the procedure well with no immediate complications

## 2019-10-18 NOTE — OP NOTES
[address] [facility] [address]                              [address] [facility] [place] [address]                               OPERATIVE REPORT    PATIENT:       Sundeep Kumar    MRN:              [id number]   D ATE:            10/18/2019      PREOPERATIVE DIAGNOSES  1. Intrauterine pregnancy at term. 2. Elective repeat  section. 3.  Spontaneous rupture of membranes  4. Uterine contractions    POSTOPERATIVE DIAGNOSES  Same  PROCEDURE PERFORMED: Repeat low-transverse  section of a viable Male  infant. Assistant: Circ-1: Sangita Mcclendonells: Karyn Lora RN  Scrub Tech-1: Lidia Agustin      SURGEON: Joaquin Metcalf MD    ANESTHESIA:  General Dr. Oswald Chowdhury BLOOD LOSS: 500 mL. SPECIMEN: Cord iris    FINDINGS: A viable Male infant   delivered from the vertex presentation at 18, Apgar's 8 and 9  and a weight of 7 lb. 7 oz. There was clear  amniotic fluid with normal tubes and ovaries bilaterally. COMPLICATIONS: None    DESCRIPTION OF PROCEDURE:  The patient was brought to the operating room and correctly identified. The patient received 2 g of intravenous  Ancef on call for the operating room. The patient was then placed in the  sitting position and a spinal was attempted with difficulty by Dr. Erlinda Larkin. The patient was then placed in the supine position with a roll  under her right hip. Sequential compression boots were placed on her legs  and a Mendez catheter was sterilely placed, which drained clear yellow  urine. Fetal heart tones were 150's prior to the start of the procedure. The  patient was sterilely prepped and draped. The spinal was tested and noted  to be inadequate for anesthesia. General anesthesia was administered. A time out was performed prior to making the incision.   Using the scalpel, a Pfannenstiel skin incision was made 3 fingerbreadths  above the symphysis pubis through the old scar. This was carried down  sharply and bluntly to the level of the fascia, which was nicked in the  midline and extended transversely with the Bovie. The superior and then  inferior rectus fascia was grasped with Kocher's, elevated and sharply and  bluntly dissected from the rectus muscles. The muscles were pulled  laterally to expose the peritoneum, which was bluntly entered. The  incision was extended transversely with the surgeon's fingers. The Ari O  retractor was placed into the peritoneal cavity. A bladder flap was  created with sharp and blunt dissection. The uterus was sharply incised  with a scalpel and extended superiorly and inferiorly with the surgeon's  fingers. There was clear amniotic fluid noted on entering the uterine  cavity. A viable Male was delivered from the vertex presentation at 0869  with Apgar's of 8 and 9 and a weight of 7 lb. 7 oz. Souleymane Gazella was used to assist delivery with the setting in the green range, not over 500 mg. The infant was  well suctioned. The cord was clamped and the infant was given to the  nursery staff. A cord blood specimen was obtained. The placenta was  then manually extracted from the uterine cavity and the cavity was wiped  clean with a wet sponge. Intravenous Pitocin was administered. The uterus  was closed in 2 layers, the first a continuous, interlocking suture of 1 monocryl, and the second an imbricating suture of 1 monocryl. The bladder  flap was closed using a continuous running suture of 2-0 Vicryl. The ovaries and tubes were inspected and noted to be normal bilaterally and the  gutters were wiped free of blood. The Ari O retractor was removed from  the peritoneal cavity. The fascia was closed in one length using a  continuous running suture of #1 vicryl. The subcutaneous space was closed  using 3 interrupted sutures of 2-0 plain and the skin was closed with  Insorb staples.  The incision was cleaned and dried and exofin was placed  on the skin.    At the end of the procedure, all sharps, sponge, and instrument counts were  correct, and the Mendez was draining clear yellow urine. The patient was awakened from general anesthesia. The patient tolerated the procedure well and she was taken to the recovery  room in stable condition.                                  Preliminary Unless Duane Rashid MD        KKO:wmx  D: 05/07/2012 12:56 P   T: 05/07/2012  1:47 P  BY:  [id number]  Job#:  [id number]  [facility]  [id number]    cc:   Katherine Vizcarra MD

## 2019-10-19 LAB
BASOPHILS # BLD: 0 K/UL (ref 0–0.1)
BASOPHILS NFR BLD: 0 % (ref 0–2)
DIFFERENTIAL METHOD BLD: ABNORMAL
EOSINOPHIL # BLD: 0.1 K/UL (ref 0–0.4)
EOSINOPHIL NFR BLD: 1 % (ref 0–5)
ERYTHROCYTE [DISTWIDTH] IN BLOOD BY AUTOMATED COUNT: 14.2 % (ref 11.6–14.5)
HCT VFR BLD AUTO: 34.8 % (ref 35–45)
HGB BLD-MCNC: 11.1 G/DL (ref 12–16)
LYMPHOCYTES # BLD: 1.8 K/UL (ref 0.9–3.6)
LYMPHOCYTES NFR BLD: 13 % (ref 21–52)
MCH RBC QN AUTO: 28.1 PG (ref 24–34)
MCHC RBC AUTO-ENTMCNC: 31.9 G/DL (ref 31–37)
MCV RBC AUTO: 88.1 FL (ref 74–97)
MONOCYTES # BLD: 1.1 K/UL (ref 0.05–1.2)
MONOCYTES NFR BLD: 9 % (ref 3–10)
NEUTS SEG # BLD: 10.3 K/UL (ref 1.8–8)
NEUTS SEG NFR BLD: 77 % (ref 40–73)
PLATELET # BLD AUTO: 171 K/UL (ref 135–420)
PMV BLD AUTO: 11.2 FL (ref 9.2–11.8)
RBC # BLD AUTO: 3.95 M/UL (ref 4.2–5.3)
WBC # BLD AUTO: 13.3 K/UL (ref 4.6–13.2)

## 2019-10-19 PROCEDURE — 85025 COMPLETE CBC W/AUTO DIFF WBC: CPT

## 2019-10-19 PROCEDURE — 77030036554

## 2019-10-19 PROCEDURE — 74011250636 HC RX REV CODE- 250/636: Performed by: OBSTETRICS & GYNECOLOGY

## 2019-10-19 PROCEDURE — 65270000029 HC RM PRIVATE

## 2019-10-19 PROCEDURE — 74011250637 HC RX REV CODE- 250/637: Performed by: OBSTETRICS & GYNECOLOGY

## 2019-10-19 PROCEDURE — 36415 COLL VENOUS BLD VENIPUNCTURE: CPT

## 2019-10-19 RX ORDER — DOCUSATE SODIUM 100 MG/1
100 CAPSULE, LIQUID FILLED ORAL
Status: DISCONTINUED | OUTPATIENT
Start: 2019-10-19 | End: 2019-10-21 | Stop reason: HOSPADM

## 2019-10-19 RX ORDER — ACETAMINOPHEN 325 MG/1
650 TABLET ORAL
Status: DISCONTINUED | OUTPATIENT
Start: 2019-10-19 | End: 2019-10-21 | Stop reason: HOSPADM

## 2019-10-19 RX ORDER — MEPERIDINE HYDROCHLORIDE 50 MG/1
50-100 TABLET ORAL
Status: DISCONTINUED | OUTPATIENT
Start: 2019-10-19 | End: 2019-10-21 | Stop reason: HOSPADM

## 2019-10-19 RX ADMIN — SODIUM CHLORIDE, SODIUM LACTATE, POTASSIUM CHLORIDE, AND CALCIUM CHLORIDE 125 ML/HR: 600; 310; 30; 20 INJECTION, SOLUTION INTRAVENOUS at 04:09

## 2019-10-19 RX ADMIN — ACETAMINOPHEN 650 MG: 325 TABLET ORAL at 15:31

## 2019-10-19 RX ADMIN — KETOROLAC TROMETHAMINE 30 MG: 30 INJECTION, SOLUTION INTRAMUSCULAR at 06:36

## 2019-10-19 RX ADMIN — MEPERIDINE HYDROCHLORIDE 50 MG: 50 TABLET ORAL at 20:12

## 2019-10-19 RX ADMIN — KETOROLAC TROMETHAMINE 30 MG: 30 INJECTION, SOLUTION INTRAMUSCULAR at 17:41

## 2019-10-19 RX ADMIN — MEPERIDINE HYDROCHLORIDE 50 MG: 50 TABLET ORAL at 15:31

## 2019-10-19 RX ADMIN — ACETAMINOPHEN 650 MG: 325 TABLET ORAL at 20:13

## 2019-10-19 RX ADMIN — KETOROLAC TROMETHAMINE 30 MG: 30 INJECTION, SOLUTION INTRAMUSCULAR at 11:36

## 2019-10-19 RX ADMIN — SODIUM CHLORIDE, SODIUM LACTATE, POTASSIUM CHLORIDE, AND CALCIUM CHLORIDE 125 ML/HR: 600; 310; 30; 20 INJECTION, SOLUTION INTRAVENOUS at 13:08

## 2019-10-19 RX ADMIN — KETOROLAC TROMETHAMINE 30 MG: 30 INJECTION, SOLUTION INTRAMUSCULAR at 00:30

## 2019-10-19 NOTE — PROGRESS NOTES
Problem: Patient Education: Go to Patient Education Activity  Goal: Patient/Family Education  Outcome: Progressing Towards Goal     Problem: Pain  Goal: *Control of Pain  Outcome: Progressing Towards Goal     Problem: Patient Education: Go to Patient Education Activity  Goal: Patient/Family Education  Outcome: Progressing Towards Goal     Problem: Falls - Risk of  Goal: *Absence of Falls  Description  Document Catalina Bautista Fall Risk and appropriate interventions in the flowsheet.   Outcome: Progressing Towards Goal  Note:   Fall Risk Interventions:            Medication Interventions: Patient to call before getting OOB                   Problem:  Delivery: Postpartum Day 1  Goal: Activity/Safety  Outcome: Progressing Towards Goal  Goal: Diagnostic Test/Procedures  Outcome: Progressing Towards Goal  Goal: Nutrition/Diet  Outcome: Progressing Towards Goal  Goal: Medications  Outcome: Progressing Towards Goal  Goal: Respiratory  Outcome: Progressing Towards Goal  Goal: *Vital signs within defined limits  Outcome: Progressing Towards Goal  Goal: *Labs within defined limits  Outcome: Progressing Towards Goal  Goal: *Hemodynamically stable  Outcome: Progressing Towards Goal  Goal: *Optimal pain control at patient's stated goal  Outcome: Progressing Towards Goal  Goal: *Participates in infant care  Outcome: Progressing Towards Goal  Goal: *Demonstrates progressive activity  Outcome: Progressing Towards Goal  Goal: *Tolerating diet  Outcome: Progressing Towards Goal  Goal: *Performs self perineal care  Outcome: Progressing Towards Goal    TANGELA RN

## 2019-10-19 NOTE — PROGRESS NOTES
1910- Bedside report received from IRIS De León RN. Pt. Stable. Needs addressed. Callbell within reach. 2012- Pt stated headache pain 2/10. Caffeine effective. No visual or neurological disturbances. VS stable. Discussed pain management. Pain medication administered as ordered. Whiteboard updated. Discussed plan of care. Pt educated on s/s to report. Pt verbalized understanding. Requested stool softener. 2020- Discussed pts updated status with Dr. Joyce Carrillo, anesthesiology. Will continue to monitor pt and update physician with any changes. 2235- Rounding completed. Re educated pt on pain management and plan of care. Verbalized understanding. 0023- Pt. Joined at bedside by FOB. AAOx4. Vital signs stable. Will continue to monitor. Pain 2/10. Educated on pain management. Pain medication administered as ordered. Whiteboard updated. Educated on plan of care and signs and symptoms to report. No further questions on concerns at this time. Fundus firm at U -1, scant rubra lochia. No clots noted. Assessment complete. Callbell within reach. Bed in lowest position. No needs expressed at this time. 5- Baby to be monitored by nursing so mother can rest, per mothers request. FOB present at bedside. Baby bands and HUGs present, secure, and verified prior to leaving room. 1939- Rounding complete. Pt resting, eyes closed, chest rise and fall observed. FOB present at bedside in chair. Baby being monitored by nursing. 5991- VS stable. Pain 4/10, reporting headache. Medication administered per order. Pt denies any visual disturbances. FOB present at bedside. Encouraged dimmed lightening and activity/ambulation. Pt verbalizes understanding. Baby being monitored by nursing. 0630- Pain 1/10. Reports relief of headache, with \"slight throbbing headache\" still noted. Pain medication administered per order. Baby returned to rooming in. FOB present at bedside. No needs expressed at this time.  Bed in lowest position. Call bell within reach. 9693- Bedside and Verbal shift change report given to LESLIE Reece RN  (oncoming nurse) by TK Luna (offgoing nurse). Report included the following information SBAR, Kardex, Intake/Output, MAR and Recent Results.

## 2019-10-19 NOTE — PROGRESS NOTES
1926 Bedside report received from Trevor Wiley RN. Pt. Stable. PCA check completed with off going nurse. Pain rated 0/10. Discontinued pts. O2 to monitor . Needs addressed. Callbell within reach. 2111- Pt. Joined at bedside by significant other and baby. AAOx4. Vital signs stable. Will continue to monitor. Pain 3/10. Educated on pain management. Encouraged use of PCA. Whiteboard updated. Educated on plan of care and signs and symptoms to report. No further questions on concerns at this time. Fundus firm at U , scant, rubra lochia. No clots noted. Incision ROBERTO, intact. SCDs on. Maintenance fluids administering. PCA check complete. Callbell within reach. Bed in lowest position. 8501- Pt shift assessment complete. Pain rated 1/10, pain medication administered as ordered. Fundus firm at U , scant, rubra lochia. No clots noted. Incision ROBERTO, intact. SCDs on. Maintenance fluids administering. PCA check complete. Callbell within reach. Bed in lowest position. 2880- Rounding complete. Needs addressed. No further concerns expressed. 0409- Rounding complete. Pain 3/10. Encouraged PCA pump. Fundus firm U-1. Small Rubra lochia. Incision ROBERTO intact. 0730- Bedside and Verbal shift change report given to IRIS Ambrose  (oncoming nurse) by TK Sanchez (offgoing nurse). Report included the following information SBAR, Kardex, Intake/Output, MAR and Recent Results.

## 2019-10-19 NOTE — PROGRESS NOTES
Anesthesia: Post-op Duramorph Rounds      Pt is POD#1 s/p c section and reported good post-op analgesia. No complaints of headache from neuraxial block attempt, just some minor back soreness. No complaints of residual motor or sensory deficits. No apparent anesthetic complications. Patient is recovering well. She did not have any complaints or concerns about the anesthesia.     Conrad Johnson MD

## 2019-10-19 NOTE — PROGRESS NOTES
1130 Patient complains of HA after eating lunch, Toradol due, will give. 1430: headache not improved. PCA pump keeps alarming. Dr. Kristen France called and orders were received to D/C PCA, Start patient on Demerol PRN, and Tylenol PRN. 1519: PCA stopped, Demerol given, FOB to take older child out of room to the park, baby to the nursery so mom can get some rest to help headache.    1700: Headache still unrelieved gets worse upon sitting/standing, better with laying. Dr. Elizabeth Vargas paged with Anesthesia. Fluid hydration-currently being done, NSAID-currently getting Toradol scheduled. Will try Caffeine if that does not work she will assess for spinal headache. Was given patient room phone # to call and check in with patient. 1720: Discussed plan of care with patient. agreeable with plan. 1830: Patient states headache is \"not bad\" but has not gotten up to use the bathroom. Will assess when patient gets up again. 1910: Bedside shift change report given to Jesus He Rn (oncoming nurse) by IRIS Betancur (offgoing nurse). Report included the following information SBAR, Kardex, MAR and Recent Results.

## 2019-10-19 NOTE — PROGRESS NOTES
Post-Operative  Day 1    Ari Gregory     Assessment: Post-Op day 1, stable    Plan:   1. Routine post-operative care   2. The risks and benefits of the circumcision  procedure and anesthesia including: bleeding, infection, variability of cosmetic results were discussed at length with the mother. She is aware that future repeat procedures may be necessary. She gives informed consent to proceed as noted and her questions are answered. Information for the patient's :  Mihaela Martinez [[id number]]   , Low Transverse   Patient doing well without significant complaint. Nausea and vomiting resolved, tolerating liquids, no flatus, kerns in place. breast      Vitals:  Visit Vitals  /74 (BP 1 Location: Right arm, BP Patient Position: At rest)   Pulse 84   Temp 97.8 °F (36.6 °C)   Resp 18   Ht 5' 5.5\" (1.664 m)   Wt 85.3 kg (188 lb)   LMP 2019 (Exact Date)   SpO2 100%   Breastfeeding? Yes   BMI 30.81 kg/m²     Temp (24hrs), Av °F (36.7 °C), Min:97 °F (36.1 °C), Max:98.7 °F (37.1 °C)      Last 24hr Input/Output:    Intake/Output Summary (Last 24 hours) at 10/19/2019 0743  Last data filed at 10/19/2019 0515  Gross per 24 hour   Intake 500 ml   Output 4900 ml   Net -4400 ml          Exam:        Patient without distress. Lungs clear. Abdomen, bowel sounds present, soft, expected tenderness, fundus firm Wound dressing intact     Perineum normal lochia noted               Lower extremities are negative for swelling, cords or tenderness.     Labs:   Lab Results   Component Value Date/Time    WBC 13.3 (H) 10/19/2019 06:05 AM    WBC 13.5 (H) 10/18/2019 02:28 AM    WBC 9.5 2019 12:00 PM    WBC 8.8 10/29/2018 07:45 PM    WBC 9.6 10/03/2018 05:13 PM    WBC 20.3 (H) 2013 02:58 AM    WBC 14.5 (H) 2013 06:30 AM    HGB 11.1 (L) 10/19/2019 06:05 AM    HGB 13.1 10/18/2019 02:28 AM    HGB 12.7 2019 11:11 AM    HGB 14.1 2019 12:00 PM    HGB 14.6 10/29/2018 07:45 PM    HGB 14.6 10/03/2018 05:13 PM    HGB 12.5 04/19/2013 02:58 AM    HGB 14.1 04/18/2013 06:30 AM    HCT 34.8 (L) 10/19/2019 06:05 AM    HCT 39.7 10/18/2019 02:28 AM    HCT 41.6 04/11/2019 12:00 PM    HCT 43.3 10/29/2018 07:45 PM    HCT 43.3 10/03/2018 05:13 PM    HCT 37.4 04/19/2013 02:58 AM    HCT 41.7 04/18/2013 06:30 AM    PLATELET 117 05/70/9253 06:05 AM    PLATELET 076 38/98/7803 02:28 AM    PLATELET 386 89/86/9705 12:00 PM    PLATELET 516 67/49/8512 07:45 PM    PLATELET 504 38/29/7651 05:13 PM    PLATELET 012 28/24/8459 02:58 AM    PLATELET 513 01/13/3034 06:30 AM       Recent Results (from the past 24 hour(s))   CBC WITH AUTOMATED DIFF    Collection Time: 10/19/19  6:05 AM   Result Value Ref Range    WBC 13.3 (H) 4.6 - 13.2 K/uL    RBC 3.95 (L) 4.20 - 5.30 M/uL    HGB 11.1 (L) 12.0 - 16.0 g/dL    HCT 34.8 (L) 35.0 - 45.0 %    MCV 88.1 74.0 - 97.0 FL    MCH 28.1 24.0 - 34.0 PG    MCHC 31.9 31.0 - 37.0 g/dL    RDW 14.2 11.6 - 14.5 %    PLATELET 406 745 - 324 K/uL    MPV 11.2 9.2 - 11.8 FL    NEUTROPHILS 77 (H) 40 - 73 %    LYMPHOCYTES 13 (L) 21 - 52 %    MONOCYTES 9 3 - 10 %    EOSINOPHILS 1 0 - 5 %    BASOPHILS 0 0 - 2 %    ABS. NEUTROPHILS 10.3 (H) 1.8 - 8.0 K/UL    ABS. LYMPHOCYTES 1.8 0.9 - 3.6 K/UL    ABS. MONOCYTES 1.1 0.05 - 1.2 K/UL    ABS. EOSINOPHILS 0.1 0.0 - 0.4 K/UL    ABS.  BASOPHILS 0.0 0.0 - 0.1 K/UL    DF AUTOMATED       Lio Toney MD  10/19/2019  7:43 AM

## 2019-10-19 NOTE — LACTATION NOTE
This note was copied from a baby's chart. Attempted to latch, sucked a few times then slept. Discussed normal  patterns with both parents. Encouraged skin to skin.  Will page if needed

## 2019-10-20 PROCEDURE — 74011250637 HC RX REV CODE- 250/637: Performed by: OBSTETRICS & GYNECOLOGY

## 2019-10-20 PROCEDURE — 74011250636 HC RX REV CODE- 250/636: Performed by: OBSTETRICS & GYNECOLOGY

## 2019-10-20 PROCEDURE — 65270000029 HC RM PRIVATE

## 2019-10-20 RX ORDER — IBUPROFEN 400 MG/1
800 TABLET ORAL
Status: DISCONTINUED | OUTPATIENT
Start: 2019-10-20 | End: 2019-10-21 | Stop reason: HOSPADM

## 2019-10-20 RX ADMIN — IBUPROFEN 800 MG: 400 TABLET, FILM COATED ORAL at 21:40

## 2019-10-20 RX ADMIN — ACETAMINOPHEN 650 MG: 325 TABLET ORAL at 04:54

## 2019-10-20 RX ADMIN — KETOROLAC TROMETHAMINE 30 MG: 30 INJECTION, SOLUTION INTRAMUSCULAR at 12:36

## 2019-10-20 RX ADMIN — KETOROLAC TROMETHAMINE 30 MG: 30 INJECTION, SOLUTION INTRAMUSCULAR at 00:24

## 2019-10-20 RX ADMIN — MEPERIDINE HYDROCHLORIDE 100 MG: 50 TABLET ORAL at 12:36

## 2019-10-20 RX ADMIN — DOCUSATE SODIUM 100 MG: 100 CAPSULE, LIQUID FILLED ORAL at 17:41

## 2019-10-20 RX ADMIN — KETOROLAC TROMETHAMINE 30 MG: 30 INJECTION, SOLUTION INTRAMUSCULAR at 06:29

## 2019-10-20 RX ADMIN — DOCUSATE SODIUM 100 MG: 100 CAPSULE, LIQUID FILLED ORAL at 00:37

## 2019-10-20 RX ADMIN — MEPERIDINE HYDROCHLORIDE 100 MG: 50 TABLET ORAL at 18:55

## 2019-10-20 RX ADMIN — MEPERIDINE HYDROCHLORIDE 100 MG: 50 TABLET ORAL at 23:21

## 2019-10-20 RX ADMIN — MEPERIDINE HYDROCHLORIDE 100 MG: 50 TABLET ORAL at 04:54

## 2019-10-20 NOTE — PROGRESS NOTES
6064 Bedside and Verbal shift change report given to MOUSTAPHA Bernstein RN  (oncoming nurse) by TK Burton RN (offgoing nurse). Report included the following information SBAR, Kardex, Intake/Output, MAR and Recent Results. Patient sleeping in bed at this time    0840 Patient up showering, reports no headache at this time     1900 Bedside and Verbal shift change report given to TK Trevino and Taras Armenta RN (oncoming nurse) by Liz Gil RN (offgoing nurse). Report included the following information SBAR, Kardex, Intake/Output, MAR and Recent Results.

## 2019-10-20 NOTE — PROGRESS NOTES
Problem: Patient Education: Go to Patient Education Activity  Goal: Patient/Family Education  Outcome: Progressing Towards Goal     Problem: Pain  Goal: *Control of Pain  Outcome: Progressing Towards Goal     Problem: Patient Education: Go to Patient Education Activity  Goal: Patient/Family Education  Outcome: Progressing Towards Goal     Problem: Falls - Risk of  Goal: *Absence of Falls  Description  Document Leatha Javed Fall Risk and appropriate interventions in the flowsheet.   Outcome: Progressing Towards Goal  Note:   Fall Risk Interventions:            Medication Interventions: Evaluate medications/consider consulting pharmacy, Teach patient to arise slowly                   Problem: Patient Education: Go to Patient Education Activity  Goal: Patient/Family Education  Outcome: Progressing Towards Goal     Problem:  Delivery: Postpartum Day 2  Goal: Activity/Safety  Outcome: Progressing Towards Goal  Goal: Consults, if ordered  Outcome: Progressing Towards Goal  Goal: Nutrition/Diet  Outcome: Progressing Towards Goal  Goal: Discharge Planning  Outcome: Progressing Towards Goal  Goal: Medications  Outcome: Progressing Towards Goal  Goal: Treatments/Interventions/Procedures  Outcome: Progressing Towards Goal  Goal: Psychosocial  Outcome: Progressing Towards Goal  Goal: *Vital signs within defined limits  Outcome: Progressing Towards Goal  Goal: *Hemodynamically stable  Outcome: Progressing Towards Goal  Goal: *Optimal pain control at patient's stated goal  Outcome: Progressing Towards Goal  Goal: *Participates in infant care  Outcome: Progressing Towards Goal  Goal: *Demonstrates progressive activity  Outcome: Progressing Towards Goal  Goal: *Appropriate parent-infant bonding  Outcome: Progressing Towards Goal  Goal: *Tolerating diet  Outcome: Progressing Towards Goal       JOAO HONEYCUTT

## 2019-10-20 NOTE — PROGRESS NOTES
1905- Bedside report received from Ronny aGrrett RN. Pt. Stable. Needs addressed. Callbell within reach. 2019- Pt joined at bedside by baby and family member. Pain 2/10, \"throbbing headache\", declines interventions. Pt educated on medications, circ care, and ensuring baby has mask on at all times. Pt verbalizes understanding. Encouraged pt to ambulate to promote healing. Pt denies any current needs. Bed in lowest position, call bell within reach. 2141- Pt pain 2/10, abdominal cramping. Medication administered per order. Pt standing at baby bassinet. Family member present, in chair at bedside. No needs expressed at this time. 2230- Pt alert, supine in bed. Family present. Baby supine in bassinet on triple photo. Ne needs expressed at this time. Bed in lowest position. Call bell within reach. 2325- Pt. Joined at bedside by family member and baby. Pt verbalized allowance to discuss care and perform assessment with family member present. AAOx4. Vital signs stable. Will continue to monitor. Pain 2/10. Educated on pain management. Pain medication administered as ordered. Whiteboard updated. Educated on plan of care and signs and symptoms to report. No further questions on concerns at this time. Fundus firm at U -2, scant rubra lochia. No clots noted. Assessment complete. Callbell within reach. Bed in lowest position. 2332- Baby transported to nursery supine via bassinet for shift assessment. HUGs and bands present, secure, and verified with MOB prior to leaving room. 2351- Baby transported back to rooming in supine via bassinet. HUGs and bands present, secure, and verified with MOB upon arrival. Educated mom on importance of keeping baby mask on while on triple photo, verbalizes understanding. No current needs expressed at this time. Family member present. Call bell within reach. Bed in lowest position. 12- Educated mother on calming techniques for infant. Verbalized understanding.  Needs and concerns addressed. 0230- Pt sitting at bedside. Family member present. Baby supine in bassinet on triple photo. Denies any current needs. Bed in lowest position. Call bell within reach. 8445- Pt resting comfortably. Family member present. Baby supine in bassinet on triple photo. Bed in lowest position. Call bell within reach. 0400- Pt sitting at bedside. Family member present. Baby transported to nursery, bands verified with MOB prior to leaving. 3866- Pt sitting at bedside. Baby transported back to rooming in. Bands verified with MOB upon arrival. Pt denies any current pain, rated 0/10. Bed in lowest position. Call bell within reach. No needs expressed at this time. 0502- Pt resting comfortably, eyes closed, chest rise and fall observed. Bed in lowest position. Call bell within reach. Family member at bedside. Baby at bedside supine in bassinet on triple photo. Eye mask present and secure.     0602- Pt resting in bed comfortably. Bed in lowest position. Baby supine in bassinet on triple photo, eye mask secure. Family member at bassinet side, expresses concerns on eye mask being on for a long period of time. Family member educated on importance of keeping mask on at all times while baby is on phototherapy, verbalizes understanding and agrees to leave mask on.     8511- Pt pain 2/10. Medication administered per order. Baby supine in basinet under triple photo. Denies any current needs. Bed in lowest position. Call bell within reach. Family member present at bedside. 7621- Bedside and Verbal shift change report given to ROGELIO Altamirano LPN (oncoming nurse) by TK Ewing (offgoing nurse). Report included the following information SBAR, Kardex, Intake/Output, MAR and Recent Results.

## 2019-10-20 NOTE — PROGRESS NOTES
Post-Operative  Day 2    Ari Vitor Tj     Assessment: Post-Op day 2, doing well    Plan:   1. Routine post-operative care    Information for the patient's :  Eloina Curry [[id number]]   , Low Transverse   Patient doing well without significant complaint. Nausea and vomiting resolved, tolerating liquids, passing flatus, voiding and ambulating without difficulty. Vitals:  Visit Vitals  /80 (BP 1 Location: Right arm, BP Patient Position: At rest)   Pulse 97   Temp 97.8 °F (36.6 °C)   Resp 18   Ht 5' 5.5\" (1.664 m)   Wt 85.3 kg (188 lb)   LMP 2019 (Exact Date)   SpO2 99%   Breastfeeding? Yes   BMI 30.81 kg/m²     Temp (24hrs), Av.3 °F (36.8 °C), Min:97.8 °F (36.6 °C), Max:98.9 °F (37.2 °C)        Exam:        Patient without distress. Abdomen, bowel sounds present, soft, expected tenderness, fundus firm                Wound incision clean, dry and intact               Lower extremities are negative for swelling, cords or tenderness.     Labs:   Lab Results   Component Value Date/Time    WBC 13.3 (H) 10/19/2019 06:05 AM    WBC 13.5 (H) 10/18/2019 02:28 AM    WBC 9.5 2019 12:00 PM    WBC 8.8 10/29/2018 07:45 PM    WBC 9.6 10/03/2018 05:13 PM    WBC 20.3 (H) 2013 02:58 AM    WBC 14.5 (H) 2013 06:30 AM    HGB 11.1 (L) 10/19/2019 06:05 AM    HGB 13.1 10/18/2019 02:28 AM    HGB 12.7 2019 11:11 AM    HGB 14.1 2019 12:00 PM    HGB 14.6 10/29/2018 07:45 PM    HGB 14.6 10/03/2018 05:13 PM    HGB 12.5 2013 02:58 AM    HGB 14.1 2013 06:30 AM    HCT 34.8 (L) 10/19/2019 06:05 AM    HCT 39.7 10/18/2019 02:28 AM    HCT 41.6 2019 12:00 PM    HCT 43.3 10/29/2018 07:45 PM    HCT 43.3 10/03/2018 05:13 PM    HCT 37.4 2013 02:58 AM    HCT 41.7 2013 06:30 AM    PLATELET 120  06:05 AM    PLATELET 189  02:28 AM    PLATELET 375  12:00 PM    PLATELET 356  07:45 PM    PLATELET 727 10/03/2018 05:13 PM    PLATELET 039 22/20/1920 02:58 AM    PLATELET 245 62/41/9155 06:30 AM       No results found for this or any previous visit (from the past 24 hour(s)).   Sabas Chairez MD  10/20/2019  7:42 AM

## 2019-10-21 VITALS
OXYGEN SATURATION: 97 % | HEART RATE: 88 BPM | SYSTOLIC BLOOD PRESSURE: 131 MMHG | DIASTOLIC BLOOD PRESSURE: 80 MMHG | BODY MASS INDEX: 30.22 KG/M2 | HEIGHT: 66 IN | WEIGHT: 188 LBS | RESPIRATION RATE: 17 BRPM | TEMPERATURE: 97.9 F

## 2019-10-21 PROBLEM — Z33.1 IUP (INTRAUTERINE PREGNANCY), INCIDENTAL: Status: RESOLVED | Noted: 2019-10-18 | Resolved: 2019-10-21

## 2019-10-21 PROCEDURE — 74011250637 HC RX REV CODE- 250/637: Performed by: OBSTETRICS & GYNECOLOGY

## 2019-10-21 RX ORDER — IBUPROFEN 800 MG/1
800 TABLET ORAL
Qty: 30 TAB | Refills: 1 | Status: SHIPPED | OUTPATIENT
Start: 2019-10-21 | End: 2019-11-25

## 2019-10-21 RX ORDER — MEPERIDINE HYDROCHLORIDE 50 MG/1
50 TABLET ORAL
Qty: 30 TAB | Refills: 0 | Status: SHIPPED | OUTPATIENT
Start: 2019-10-21 | End: 2019-10-26

## 2019-10-21 RX ADMIN — DOCUSATE SODIUM 100 MG: 100 CAPSULE, LIQUID FILLED ORAL at 06:37

## 2019-10-21 RX ADMIN — IBUPROFEN 800 MG: 400 TABLET, FILM COATED ORAL at 06:37

## 2019-10-21 RX ADMIN — ACETAMINOPHEN 650 MG: 325 TABLET ORAL at 09:27

## 2019-10-21 RX ADMIN — MEPERIDINE HYDROCHLORIDE 100 MG: 50 TABLET ORAL at 06:37

## 2019-10-21 NOTE — PROGRESS NOTES
Post-Operative  Day [address]       Assessment:   Hospital Problems  Date Reviewed: 10/21/2019          Codes Class Noted POA    IUP (intrauterine pregnancy), incidental ICD-10-CM: Z34.90  ICD-9-CM: V22.2  10/18/2019 Unknown        * (Principal) History of  delivery, currently pregnant ICD-10-CM: O34.219  ICD-9-CM: 654.20  10/18/2019 Yes        Intrauterine pregnancy, incidental ICD-10-CM: Z34.90  ICD-9-CM: V22.2  2013 Yes    Overview Addendum 10/9/2019 10:26 AM by Jaydon Calles MD     Male             History of  section, low transverse ICD-10-CM: Z98.891  ICD-9-CM: V45.89  2013 Yes    Overview Addendum 2019 11:56 AM by Amanda Fallon NP     Repeat C/S at 39 wks - scheduling sheet given to lab jacques 19                 Post-Op day 3, doing well    Plan:   1. Discharge home today  2. Follow up in office in 2 and 6 weeks with Jaydon Calles MD  3. Post partum activity/wound care advised, diet as tolerated  4. Discharge Medications: ibuprofen, Demerol and medications prior to admission      Information for the patient's :  Deanna Acosta [[id number]]   , Low Transverse   Patient doing well without significant complaint. She does have a spinal headache but it is improving. Declined a blood patch. Tolerating diet, passing flatus, voiding and ambulating without difficulty. Patient has had a BM. Baby is under bili lights and will hopefully be discharged today. Patient is pumping breasts. Current Facility-Administered Medications   Medication Dose Route Frequency    oxytocin (PITOCIN) 20 units/1000 mL in 0.9% sodium chloride  125 mL/hr IntraVENous CONTINUOUS       Vitals:  Visit Vitals  /80 (BP 1 Location: Left arm, BP Patient Position: At rest)   Pulse 88   Temp 97.9 °F (36.6 °C)   Resp 17   Ht 5' 5.5\" (1.664 m)   Wt 188 lb (85.3 kg)   LMP 2019 (Exact Date)   SpO2 97%   Breastfeeding?  Yes   BMI 30.81 kg/m²     Temp (24hrs), Av.1 °F (36.7 °C), Min:97.9 °F (36.6 °C), Max:98.3 °F (36.8 °C)        Exam:        Patient without distress. Abdomen, bowel sounds present, soft, expected tenderness, fundus firm                Wound incision clean, dry and intact               Lower extremities are negative for swelling, cords or tenderness. Labs:   Lab Results   Component Value Date/Time    WBC 13.3 (H) 10/19/2019 06:05 AM    WBC 13.5 (H) 10/18/2019 02:28 AM    WBC 9.5 2019 12:00 PM    HGB 11.1 (L) 10/19/2019 06:05 AM    HGB 13.1 10/18/2019 02:28 AM    HGB 12.7 2019 11:11 AM    HCT 34.8 (L) 10/19/2019 06:05 AM    HCT 39.7 10/18/2019 02:28 AM    HCT 41.6 2019 12:00 PM    PLATELET 802 10/02/9998 06:05 AM    PLATELET 518  02:28 AM    PLATELET 273  12:00 PM       No results found for this or any previous visit (from the past 24 hour(s)).

## 2019-10-21 NOTE — PROGRESS NOTES
Assumed care of pt.  0810-eating breakfast.  0900-on phone. To call for assessment when ready. 0920-assessment completed. Tylenol givn for mild h/a at 0927. Denies any other needs. 1000-ambulating in hallway. 1035-discharge instructions reviewed with pt who verbalized understanding and signed in computer. 1107-discharged home with infant via wheelchair.

## 2019-10-21 NOTE — LACTATION NOTE
Patient currently only bottle feeding formula to infant, but breasts are slightly engorged. Discussed pumping q 3 hours, warm compress and massage prior to pumping, and ice after pumping completed to assist with engorgement. Patient currently pumping with hot packs on breast, ice packs given for after pumping session completed. Breastfeeding discharge teaching completed to include feeding on demand, foremilk and hindmilk importance, engorgement, mastitis, clogged ducts, pumping, breastmilk storage, and returning to work. Information given about unit and office phone numbers and encouraged mom to reach out if concerns arise, but that [facility] would be calling her in the next few days to follow up on breastfeeding. Mom verbalized understanding and no questions at this time.

## 2019-10-21 NOTE — DISCHARGE SUMMARY
Obstetrical Discharge Summary     Name: Kennedy Pretty MRN: [id number]  SSN: [id number]    YOB: 1985  Age: 26 y.o. Sex: female      Admit Date: 10/18/2019    Discharge Date: 10/21/2019    Admitting Physician: Rose Teresa MD     Attending Physician:  Tunde Kelly MD     Discharge Diagnoses:   Information for the patient's :  Mari Mueller [[id number]]   Delivery of a 7 lb 7.2 oz (3.38 kg) male infant via , Low Transverse on 10/18/2019 at 8:42 AM  by David Guadarrama. Apgars were 8  and 9 . Additional Diagnoses:   Problem List as of 10/21/2019 Date Reviewed: 10/21/2019          Codes Class Noted - Resolved    * (Principal) History of  delivery, currently pregnant ICD-10-CM: O34.219  ICD-9-CM: 654.20  10/18/2019 - Present        History of macrosomia in infant in prior pregnancy, currently pregnant in first trimester ICD-10-CM: O09.291  ICD-9-CM: V23.49  2019 - Present    Overview Addendum 10/15/2019 10:04 AM by Tunde Kelly MD     Early 1 hr GTT - WNLs  EFW 77% at morph. EFW 38 weeks. 10/14/19 EFW 77%, 7 lb. 8 oz.              Family history of Down syndrome ICD-10-CM: Z82.79  ICD-9-CM: V19.5  10/3/2018 - Present    Overview Signed 10/3/2018  5:12 PM by Cora Corona NP     FOB niece             History of  section, low transverse ICD-10-CM: Z98.891  ICD-9-CM: V45.89  2013 - Present    Overview Addendum 2019 11:56 AM by Cora Corona NP     Repeat C/S at 39 wks - scheduling sheet given to lab [facility] 19             RESOLVED: IUP (intrauterine pregnancy), incidental ICD-10-CM: Z34.90  ICD-9-CM: V22.2  10/18/2019 - 10/21/2019        RESOLVED: Missed ab ICD-10-CM: O02.1  ICD-9-CM: 829  10/30/2018 - 3/28/2019        RESOLVED: Intrauterine pregnancy, incidental ICD-10-CM: Z34.90  ICD-9-CM: V22.2  2013 - 10/21/2019    Overview Addendum 10/9/2019 10:26 AM by Tunde Kelly MD     Male             RESOLVED: Group B Streptococcus carrier, antepartum ICD-10-CM: O99.820  ICD-9-CM: 648.93, V02.51  2013 - 2019        RESOLVED: Large for gestational age ICD-8-CM: P80.4  ICD-9-CM: 766.1  2013 - 2019        RESOLVED: Non-reassuring fetal heart tones, delivered, current hospitalization ICD-10-CM: O76  ICD-9-CM: 659.71  2013 - 2019              Lab Results   Component Value Date/Time    Rubella, External imm 10/02/2012    GrBStrep, External pos 2013     Recent Labs     10/19/19  0605   [facility] Post[facility] course was complicated by spinal headache, which added 0 days to the patient's length of stay. Patient Instructions:   Current Discharge Medication List      START taking these medications    Details   ibuprofen (MOTRIN) 800 mg tablet Take 1 Tab by mouth every eight (8) hours as needed for Pain. Qty: 30 Tab, Refills: 1      meperidine (DEMEROL) 50 mg tablet Take 1 Tab by mouth every four (4) hours as needed for Pain for up to 5 days. Max Daily Amount: 300 mg. Indications: excessive pain  Qty: 30 Tab, Refills: 0    Associated Diagnoses: History of  section, low transverse         CONTINUE these medications which have NOT CHANGED    Details   ascorbate calcium (VITAMIN C PO) Take  by mouth. Cetirizine (ZYRTEC) 10 mg cap Take  by mouth. PRENATAL VIT37/IRON/FOLIC ACID (PRENATA PO) Take  by mouth. Reference my discharge instructions. Follow-up Appointments   Procedures    FOLLOW UP VISIT Appointment in: Two Weeks     Standing Status:   Standing     Number of Occurrences:   1     Order Specific Question:   Appointment in     Answer:    Two Weeks        Signed By:  Davon Cee MD     2019                       BST

## 2019-10-21 NOTE — PROGRESS NOTES
Problem: Pain  Goal: *Control of Pain  Outcome: Progressing Towards Goal     Problem: Patient Education: Go to Patient Education Activity  Goal: Patient/Family Education  Outcome: Progressing Towards Goal     Problem: Falls - Risk of  Goal: *Absence of Falls  Description  Document Elaine Allen Fall Risk and appropriate interventions in the flowsheet.   Outcome: Progressing Towards Goal  Note:   Fall Risk Interventions:            Medication Interventions: Teach patient to arise slowly                   Problem:  Delivery: Postpartum Day 3  Goal: Activity/Safety  Outcome: Progressing Towards Goal  Goal: Nutrition/Diet  Outcome: Progressing Towards Goal  Goal: Discharge Planning  Outcome: Progressing Towards Goal  Goal: Medications  Outcome: Progressing Towards Goal  Goal: Treatments/Interventions/Procedures  Outcome: Progressing Towards Goal  Goal: Psychosocial  Outcome: Progressing Towards Goal     TANGELA RN

## 2019-10-21 NOTE — DISCHARGE INSTRUCTIONS
POST DELIVERY DISCHARGE INSTRUCTIONS    Name: Db Blair  YOB: 1985  Primary Diagnosis: Principal Problem:    History of  delivery, currently pregnant (10/18/2019)    Active Problems:    History of  section, low transverse (2013)      Overview: Repeat C/S at 39 wks - scheduling sheet given to lab [facility] 19        General:     Diet/Diet Restrictions:  Eight 8-ounce glasses of fluid daily (water, juices); avoid excessive caffeine intake. Meals/snacks as desired which are high in fiber and carbohydrates and low in fat and cholesterol. Physical Activity / Restrictions / Safety:     Avoid heavy lifting, no more that 8 lbs. For 2-3 weeks; limit use of stairs to 2 times daily for the first week home. No driving for one week. Avoid intercourse 4-6 weeks, no douching or tampon use. Check with obstetrician before starting or resuming an exercise program.         Discharge Instructions/Special Treatment/Home Care Needs:     Continue prenatal vitamins. Continue to use squirt bottle with warm water on your episiotomy after each bathroom use until bleeding stops. Call your doctor for the following:     Fever over 100.4 degrees by mouth. Vaginal bleeding heavier than a normal menstrual period or clot larger than a golf ball. Red streaks or increased swelling of legs, painful red streaks on your breast.  Painful urination, constipation and increased pain or swelling or discharge with your incision. If you feel extremely anxious or overwhelmed. If you have thoughts of harming yourself and/or your baby. Pain Management:     Pain Management:   Take Acetaminophen (Tylenol) or Ibuprofen (Advil, Motrin), as directed for pain. Use a warm Sitz bath 3 times daily to relieve episiotomy or hemorrhoidal discomfort. Heating pad to  incision as needed. For hemorrhoidal discomfort, use Tucks and Anusol cream as needed and directed. Follow-Up Care:      These are general instructions for a healthy lifestyle:    No smoking/ No tobacco products/ Avoid exposure to second hand smoke    Surgeon General's Warning:  Quitting smoking now greatly reduces serious risk to your health. Obesity, smoking, and sedentary lifestyle greatly increases your risk for illness    A healthy diet, regular physical exercise & weight monitoring are important for maintaining a healthy lifestyle    Recognize signs and symptoms of STROKE:    F-face looks uneven    A-arms unable to move or move unevenly    S-speech slurred or non-existent    T-time-call 911 as soon as signs and symptoms begin-DO NOT go       Back to bed or wait to see if you get better-TIME IS BRAIN. Patient armband removed and given to patient to take home.   Patient was informed of the privacy risks if armband lost or stolen

## 2019-10-23 ENCOUNTER — HOSPITAL ENCOUNTER (OUTPATIENT)
Age: 34
Setting detail: OUTPATIENT SURGERY
Discharge: HOME | End: 2019-10-23
Attending: ANESTHESIOLOGY | Admitting: ANESTHESIOLOGY
Payer: COMMERCIAL

## 2019-10-23 ENCOUNTER — ANESTHESIA EVENT (OUTPATIENT)
Dept: SURGERY | Age: 34
End: 2019-10-23
Payer: COMMERCIAL

## 2019-10-23 ENCOUNTER — ANESTHESIA (OUTPATIENT)
Dept: SURGERY | Age: 34
End: 2019-10-23
Payer: COMMERCIAL

## 2019-10-23 VITALS
RESPIRATION RATE: 16 BRPM | DIASTOLIC BLOOD PRESSURE: 90 MMHG | HEART RATE: 95 BPM | OXYGEN SATURATION: 98 % | TEMPERATURE: 98 F | SYSTOLIC BLOOD PRESSURE: 145 MMHG

## 2019-10-23 PROCEDURE — 77030007879 HC KT SPN EPDRL TELE -B: Performed by: ANESTHESIOLOGY

## 2019-10-23 PROCEDURE — 74011250636 HC RX REV CODE- 250/636: Performed by: ANESTHESIOLOGY

## 2019-10-23 PROCEDURE — 62273 INJECT EPIDURAL PATCH: CPT

## 2019-10-23 PROCEDURE — 77030020782 HC GWN BAIR PAWS FLX 3M -B

## 2019-10-23 RX ORDER — SODIUM CHLORIDE, SODIUM LACTATE, POTASSIUM CHLORIDE, CALCIUM CHLORIDE 600; 310; 30; 20 MG/100ML; MG/100ML; MG/100ML; MG/100ML
125 INJECTION, SOLUTION INTRAVENOUS CONTINUOUS
Status: DISCONTINUED | OUTPATIENT
Start: 2019-10-23 | End: 2019-10-23 | Stop reason: HOSPADM

## 2019-10-23 RX ADMIN — SODIUM CHLORIDE, SODIUM LACTATE, POTASSIUM CHLORIDE, AND CALCIUM CHLORIDE 125 ML/HR: 600; 310; 30; 20 INJECTION, SOLUTION INTRAVENOUS at 15:57

## 2019-10-23 NOTE — DISCHARGE INSTRUCTIONS
Patient Education     Patient armband removed and shredded     Blood Patch: Care Instructions  Your Care Instructions    A blood patch is a procedure that uses your own blood to help your headache. Headaches may happen after certain procedures that involve the spine, such as a myelogram, a spinal tap, or an epidural for anesthesia. In these procedures, the needle that is used sometimes causes a bit of spinal fluid to leak out of the space around your spinal cord. The leak usually isn't dangerous. But if enough fluid leaks out, it changes the pressure around your spinal cord, and that can cause a very bad headache. To apply a blood patch, your doctor takes blood from your arm and injects it into the area of your lower back where the leak happened. The blood restores the pressure around your spinal cord. It also helps seal any leak that may still be there. Many people feel better right away, but it could take a day or two. And a few people need to have a second blood patch. Follow-up care is a key part of your treatment and safety. Be sure to make and go to all appointments, and call your doctor if you are having problems. It's also a good idea to know your test results and keep a list of the medicines you take. How can you care for yourself at home? · For the first 24 hours, return to your regular activities slowly. Avoid strenuous activity. · If the injection site is sore, put ice or a cold pack on the area for 10 to 20 minutes at a time. Put a thin cloth between the ice and your skin. When should you call for help?   Call your doctor now or seek immediate medical care if:    · You have a fever.     · You have a new or worse headache.     · You have a stiff neck.     · You have drainage or bleeding from the puncture site.     · You have tingling, weakness, or numbness in your legs.     · You have trouble urinating or can pass only very small amounts of urine.    Watch closely for changes in your health, and be sure to contact your doctor if:    · You do not get better as expected. Where can you learn more? Go to http://gopi-reji.info/. Michael Cervantes in the search box to learn more about \"Blood Patch: Care Instructions. \"  Current as of: December 13, 2018  Content Version: 12.2  © 8356-3567 [employer]. Care instructions adapted under license by [employer] (which disclaims liability or warranty for this information). If you have questions about a medical condition or this instruction, always ask your healthcare professional. [address] any warranty or liability for your use of this information.

## 2019-10-23 NOTE — ANESTHESIA PREPROCEDURE EVALUATION
Relevant Problems   No relevant active problems       Anesthetic History     History of awareness of surgery under anesthesia          Review of Systems / Medical History  Patient summary reviewed, nursing notes reviewed and pertinent labs reviewed    Pulmonary                   Neuro/Psych         Psychiatric history    Comments: Patient complains of a severe headache, which started after her . There were multiple attempts for spinal anesthesia. Headache is positional- supine better than sitting. Also, she complains of neck pain. Cardiovascular                  Exercise tolerance: >4 METS     GI/Hepatic/Renal  Within defined limits              Endo/Other  Within defined limits           Other Findings                   Anesthetic Plan    ASA: 1  Anesthesia type: epidural  Risk and benefits fully explained to the patient including bleeding, headache, nerve damage, infection, nausea, back pain, and hemodynamic changes. Patient understands and agrees to the procedure. Post-op pain plan if not by surgeon: indwelling epidural catheter      Anesthetic plan and risks discussed with: Patient      Postdural puncture headache   Plan- Epidural blood patch- Risk,benefits and alternative were fully explained to the patient including bleeding, infection, and nerve damage. The patient wants the procedure.

## 2019-10-23 NOTE — ANESTHESIA POSTPROCEDURE EVALUATION
Procedure(s):  BLOOD PATCH TO BE DONE BY DR. Viet Callahan.    epidural    Anesthesia Post Evaluation      Multimodal analgesia: multimodal analgesia used between 6 hours prior to anesthesia start to PACU discharge  Patient location during evaluation: bedside  Patient participation: complete - patient participated  Level of consciousness: awake  Pain management: adequate  Airway patency: patent  Anesthetic complications: no  Cardiovascular status: acceptable  Respiratory status: acceptable  Hydration status: acceptable  Comments: Neurological intact   Post anesthesia nausea and vomiting:  none      Vitals Value Taken Time   /88 10/23/2019  5:07 PM   Temp     Pulse 96 10/23/2019  5:08 PM   Resp 12 10/23/2019  5:07 PM   SpO2 98 % 10/23/2019  5:08 PM   Vitals shown include unvalidated device data.

## 2019-10-23 NOTE — ANESTHESIA PROCEDURE NOTES
Blood Patch  Date/Time: 10/23/2019 4:52 PM  Performed by: Ileana Sharma MD  Authorized by: Ileana Sharma MD   Start Time:  10/23/2019 4:15 PM  End Time:  10/23/2019 4:50 PM    Pre-procedure:    Indication: spinal headache      Preanesthetic Checklist: patient identified, risks and benefits discussed, site marked, anesthesia consent, patient being monitored and timeout performed          Blood Patch:   Monitoring:  Continuous pulse oximetry and blood pressure monitoring  Location of venous blood draw:  Arm  Patient Position:  Seated  Prep Region:  Lumbar  Prep: chlorhexidine        Location:  L3-4    Injection Technique:  DIANA saline  Needle Type:  Tuohy  Needle Gauge:  17 G  Sterile Blood Injected (mL):  13    Assessment:   Attempts:  1      Patient tolerance:  Patient tolerated the procedure well with no immediate complications

## 2022-03-18 PROBLEM — O09.291 HISTORY OF MACROSOMIA IN INFANT IN PRIOR PREGNANCY, CURRENTLY PREGNANT IN FIRST TRIMESTER: Status: ACTIVE | Noted: 2019-04-11

## 2022-03-19 PROBLEM — Z82.79 FAMILY HISTORY OF DOWN SYNDROME: Status: ACTIVE | Noted: 2018-10-03

## 2022-03-19 PROBLEM — O34.219 HISTORY OF CESAREAN DELIVERY, CURRENTLY PREGNANT: Status: ACTIVE | Noted: 2019-10-18

## 2023-04-27 ENCOUNTER — HOSPITAL ENCOUNTER (OUTPATIENT)
Facility: HOSPITAL | Age: 38
Discharge: HOME | End: 2023-04-27
Payer: COMMERCIAL

## 2023-04-27 DIAGNOSIS — N93.9 ABNORMAL UTERINE BLEEDING: ICD-10-CM

## 2023-04-27 DIAGNOSIS — N85.9 NONINFLAMMATORY DISORDER OF UTERUS: ICD-10-CM

## 2023-04-27 PROCEDURE — A9579 GAD-BASE MR CONTRAST NOS,1ML: HCPCS | Performed by: REGISTERED NURSE

## 2023-04-27 PROCEDURE — 6360000004 HC RX CONTRAST MEDICATION: Performed by: REGISTERED NURSE

## 2023-04-27 PROCEDURE — 72197 MRI PELVIS W/O & W/DYE: CPT

## 2023-04-27 RX ADMIN — GADOTERIDOL 15 ML: 279.3 INJECTION, SOLUTION INTRAVENOUS at 09:53

## 2023-06-27 ENCOUNTER — HOSPITAL ENCOUNTER (OUTPATIENT)
Facility: HOSPITAL | Age: 38
Setting detail: SPECIMEN
Discharge: HOME | End: 2023-06-30
Payer: COMMERCIAL

## 2023-06-27 PROCEDURE — 88305 TISSUE EXAM BY PATHOLOGIST: CPT

## 2024-01-24 ENCOUNTER — TRANSCRIBE ORDERS (OUTPATIENT)
Facility: HOSPITAL | Age: 39
End: 2024-01-24

## 2024-01-24 DIAGNOSIS — Z30.431 ENCOUNTER FOR ROUTINE CHECKING OF INTRAUTERINE CONTRACEPTIVE DEVICE: Primary | ICD-10-CM

## 2024-04-10 ENCOUNTER — HOSPITAL ENCOUNTER (OUTPATIENT)
Facility: HOSPITAL | Age: 39
Discharge: HOME | End: 2024-04-13
Payer: COMMERCIAL

## 2024-04-10 DIAGNOSIS — Z01.818 PRE-OP TESTING: ICD-10-CM

## 2024-04-10 LAB
ERYTHROCYTE [DISTWIDTH] IN BLOOD BY AUTOMATED COUNT: 15.4 % (ref 11.6–14.5)
HCG SERPL QL: NEGATIVE
HCT VFR BLD AUTO: 39.3 % (ref 35–45)
HGB BLD-MCNC: 11.8 G/DL (ref 12–16)
MCH RBC QN AUTO: 25.3 PG (ref 24–34)
MCHC RBC AUTO-ENTMCNC: 30 G/DL (ref 31–37)
MCV RBC AUTO: 84.3 FL (ref 78–100)
NRBC # BLD: 0 K/UL (ref 0–0.01)
NRBC BLD-RTO: 0 PER 100 WBC
PLATELET # BLD AUTO: 189 K/UL (ref 135–420)
PMV BLD AUTO: 12.5 FL (ref 9.2–11.8)
RBC # BLD AUTO: 4.66 M/UL (ref 4.2–5.3)
WBC # BLD AUTO: 5 K/UL (ref 4.6–13.2)

## 2024-04-10 PROCEDURE — 85027 COMPLETE CBC AUTOMATED: CPT

## 2024-04-10 PROCEDURE — 84703 CHORIONIC GONADOTROPIN ASSAY: CPT

## 2024-04-16 ENCOUNTER — ANESTHESIA EVENT (OUTPATIENT)
Facility: HOSPITAL | Age: 39
End: 2024-04-16
Payer: COMMERCIAL

## 2024-04-16 ENCOUNTER — HOSPITAL ENCOUNTER (OUTPATIENT)
Facility: HOSPITAL | Age: 39
Setting detail: OBSERVATION
Discharge: HOME | End: 2024-04-17
Attending: OBSTETRICS & GYNECOLOGY | Admitting: OBSTETRICS & GYNECOLOGY
Payer: COMMERCIAL

## 2024-04-16 ENCOUNTER — ANESTHESIA (OUTPATIENT)
Facility: HOSPITAL | Age: 39
End: 2024-04-16
Payer: COMMERCIAL

## 2024-04-16 PROBLEM — N93.9 ABNORMAL UTERINE BLEEDING (AUB): Status: ACTIVE | Noted: 2024-04-16

## 2024-04-16 LAB
ABO + RH BLD: NORMAL
BLOOD GROUP ANTIBODIES SERPL: NORMAL
HCG UR QL: NEGATIVE
SPECIMEN EXP DATE BLD: NORMAL

## 2024-04-16 PROCEDURE — 6370000000 HC RX 637 (ALT 250 FOR IP): Performed by: OBSTETRICS & GYNECOLOGY

## 2024-04-16 PROCEDURE — 2580000003 HC RX 258: Performed by: OBSTETRICS & GYNECOLOGY

## 2024-04-16 PROCEDURE — 3600000002 HC SURGERY LEVEL 2 BASE: Performed by: OBSTETRICS & GYNECOLOGY

## 2024-04-16 PROCEDURE — 81025 URINE PREGNANCY TEST: CPT

## 2024-04-16 PROCEDURE — 86900 BLOOD TYPING SEROLOGIC ABO: CPT

## 2024-04-16 PROCEDURE — G0378 HOSPITAL OBSERVATION PER HR: HCPCS

## 2024-04-16 PROCEDURE — 6360000002 HC RX W HCPCS: Performed by: OBSTETRICS & GYNECOLOGY

## 2024-04-16 PROCEDURE — 86850 RBC ANTIBODY SCREEN: CPT

## 2024-04-16 PROCEDURE — 7100000000 HC PACU RECOVERY - FIRST 15 MIN: Performed by: OBSTETRICS & GYNECOLOGY

## 2024-04-16 PROCEDURE — 2500000003 HC RX 250 WO HCPCS: Performed by: NURSE ANESTHETIST, CERTIFIED REGISTERED

## 2024-04-16 PROCEDURE — 88307 TISSUE EXAM BY PATHOLOGIST: CPT

## 2024-04-16 PROCEDURE — 86901 BLOOD TYPING SEROLOGIC RH(D): CPT

## 2024-04-16 PROCEDURE — A4217 STERILE WATER/SALINE, 500 ML: HCPCS | Performed by: OBSTETRICS & GYNECOLOGY

## 2024-04-16 PROCEDURE — 7100000001 HC PACU RECOVERY - ADDTL 15 MIN: Performed by: OBSTETRICS & GYNECOLOGY

## 2024-04-16 PROCEDURE — 6360000002 HC RX W HCPCS: Performed by: NURSE ANESTHETIST, CERTIFIED REGISTERED

## 2024-04-16 PROCEDURE — 3700000000 HC ANESTHESIA ATTENDED CARE: Performed by: OBSTETRICS & GYNECOLOGY

## 2024-04-16 PROCEDURE — 6370000000 HC RX 637 (ALT 250 FOR IP): Performed by: SPECIALIST

## 2024-04-16 PROCEDURE — 3700000001 HC ADD 15 MINUTES (ANESTHESIA): Performed by: OBSTETRICS & GYNECOLOGY

## 2024-04-16 PROCEDURE — 6360000002 HC RX W HCPCS: Performed by: ANESTHESIOLOGY

## 2024-04-16 PROCEDURE — 3600000012 HC SURGERY LEVEL 2 ADDTL 15MIN: Performed by: OBSTETRICS & GYNECOLOGY

## 2024-04-16 PROCEDURE — 2500000003 HC RX 250 WO HCPCS: Performed by: OBSTETRICS & GYNECOLOGY

## 2024-04-16 PROCEDURE — 2720000010 HC SURG SUPPLY STERILE: Performed by: OBSTETRICS & GYNECOLOGY

## 2024-04-16 PROCEDURE — 36415 COLL VENOUS BLD VENIPUNCTURE: CPT

## 2024-04-16 PROCEDURE — 2709999900 HC NON-CHARGEABLE SUPPLY: Performed by: OBSTETRICS & GYNECOLOGY

## 2024-04-16 RX ORDER — ONDANSETRON 2 MG/ML
4 INJECTION INTRAMUSCULAR; INTRAVENOUS EVERY 6 HOURS PRN
Status: DISCONTINUED | OUTPATIENT
Start: 2024-04-16 | End: 2024-04-17 | Stop reason: HOSPADM

## 2024-04-16 RX ORDER — LIDOCAINE HYDROCHLORIDE 20 MG/ML
INJECTION, SOLUTION EPIDURAL; INFILTRATION; INTRACAUDAL; PERINEURAL PRN
Status: DISCONTINUED | OUTPATIENT
Start: 2024-04-16 | End: 2024-04-16 | Stop reason: SDUPTHER

## 2024-04-16 RX ORDER — METHYLENE BLUE 10 MG/ML
INJECTION INTRAVENOUS PRN
Status: DISCONTINUED | OUTPATIENT
Start: 2024-04-16 | End: 2024-04-16 | Stop reason: SDUPTHER

## 2024-04-16 RX ORDER — DOCUSATE SODIUM 100 MG/1
100 CAPSULE, LIQUID FILLED ORAL 2 TIMES DAILY
Status: DISCONTINUED | OUTPATIENT
Start: 2024-04-16 | End: 2024-04-17 | Stop reason: HOSPADM

## 2024-04-16 RX ORDER — SODIUM CHLORIDE 9 MG/ML
INJECTION, SOLUTION INTRAVENOUS PRN
Status: DISCONTINUED | OUTPATIENT
Start: 2024-04-16 | End: 2024-04-16 | Stop reason: HOSPADM

## 2024-04-16 RX ORDER — FENTANYL CITRATE 50 UG/ML
INJECTION, SOLUTION INTRAMUSCULAR; INTRAVENOUS PRN
Status: DISCONTINUED | OUTPATIENT
Start: 2024-04-16 | End: 2024-04-16 | Stop reason: SDUPTHER

## 2024-04-16 RX ORDER — ROCURONIUM BROMIDE 10 MG/ML
INJECTION, SOLUTION INTRAVENOUS PRN
Status: DISCONTINUED | OUTPATIENT
Start: 2024-04-16 | End: 2024-04-16 | Stop reason: SDUPTHER

## 2024-04-16 RX ORDER — MIDAZOLAM HYDROCHLORIDE 1 MG/ML
INJECTION INTRAMUSCULAR; INTRAVENOUS PRN
Status: DISCONTINUED | OUTPATIENT
Start: 2024-04-16 | End: 2024-04-16 | Stop reason: SDUPTHER

## 2024-04-16 RX ORDER — KETOROLAC TROMETHAMINE 15 MG/ML
INJECTION, SOLUTION INTRAMUSCULAR; INTRAVENOUS PRN
Status: DISCONTINUED | OUTPATIENT
Start: 2024-04-16 | End: 2024-04-16

## 2024-04-16 RX ORDER — SODIUM CHLORIDE 0.9 % (FLUSH) 0.9 %
5-40 SYRINGE (ML) INJECTION PRN
Status: DISCONTINUED | OUTPATIENT
Start: 2024-04-16 | End: 2024-04-16 | Stop reason: HOSPADM

## 2024-04-16 RX ORDER — KETOROLAC TROMETHAMINE 15 MG/ML
INJECTION, SOLUTION INTRAMUSCULAR; INTRAVENOUS PRN
Status: DISCONTINUED | OUTPATIENT
Start: 2024-04-16 | End: 2024-04-16 | Stop reason: SDUPTHER

## 2024-04-16 RX ORDER — PROPOFOL 10 MG/ML
INJECTION, EMULSION INTRAVENOUS PRN
Status: DISCONTINUED | OUTPATIENT
Start: 2024-04-16 | End: 2024-04-16 | Stop reason: SDUPTHER

## 2024-04-16 RX ORDER — SODIUM CHLORIDE, SODIUM LACTATE, POTASSIUM CHLORIDE, CALCIUM CHLORIDE 600; 310; 30; 20 MG/100ML; MG/100ML; MG/100ML; MG/100ML
INJECTION, SOLUTION INTRAVENOUS CONTINUOUS
Status: DISCONTINUED | OUTPATIENT
Start: 2024-04-16 | End: 2024-04-16 | Stop reason: HOSPADM

## 2024-04-16 RX ORDER — SODIUM CHLORIDE 0.9 % (FLUSH) 0.9 %
5-40 SYRINGE (ML) INJECTION EVERY 12 HOURS SCHEDULED
Status: DISCONTINUED | OUTPATIENT
Start: 2024-04-16 | End: 2024-04-16 | Stop reason: HOSPADM

## 2024-04-16 RX ORDER — SUCCINYLCHOLINE/SOD CL,ISO/PF 100 MG/5ML
SYRINGE (ML) INTRAVENOUS PRN
Status: DISCONTINUED | OUTPATIENT
Start: 2024-04-16 | End: 2024-04-16 | Stop reason: SDUPTHER

## 2024-04-16 RX ORDER — BUPIVACAINE HYDROCHLORIDE AND EPINEPHRINE 5; .0091 MG/ML; MG/ML
INJECTION, SOLUTION DENTAL; INFILTRATION PRN
Status: DISCONTINUED | OUTPATIENT
Start: 2024-04-16 | End: 2024-04-16 | Stop reason: ALTCHOICE

## 2024-04-16 RX ORDER — SODIUM CHLORIDE 0.9 % (FLUSH) 0.9 %
5-40 SYRINGE (ML) INJECTION EVERY 12 HOURS SCHEDULED
Status: DISCONTINUED | OUTPATIENT
Start: 2024-04-16 | End: 2024-04-17 | Stop reason: HOSPADM

## 2024-04-16 RX ORDER — SCOLOPAMINE TRANSDERMAL SYSTEM 1 MG/1
1 PATCH, EXTENDED RELEASE TRANSDERMAL ONCE
Status: DISCONTINUED | OUTPATIENT
Start: 2024-04-16 | End: 2024-04-17 | Stop reason: HOSPADM

## 2024-04-16 RX ORDER — SODIUM CHLORIDE, SODIUM LACTATE, POTASSIUM CHLORIDE, CALCIUM CHLORIDE 600; 310; 30; 20 MG/100ML; MG/100ML; MG/100ML; MG/100ML
INJECTION, SOLUTION INTRAVENOUS CONTINUOUS
Status: DISCONTINUED | OUTPATIENT
Start: 2024-04-16 | End: 2024-04-17 | Stop reason: HOSPADM

## 2024-04-16 RX ORDER — MEPERIDINE HYDROCHLORIDE 50 MG/1
50 TABLET ORAL
Status: DISCONTINUED | OUTPATIENT
Start: 2024-04-16 | End: 2024-04-17 | Stop reason: HOSPADM

## 2024-04-16 RX ORDER — SODIUM CHLORIDE 9 MG/ML
INJECTION, SOLUTION INTRAVENOUS PRN
Status: DISCONTINUED | OUTPATIENT
Start: 2024-04-16 | End: 2024-04-17 | Stop reason: HOSPADM

## 2024-04-16 RX ORDER — TRANEXAMIC ACID 650 MG/1
1300 TABLET ORAL
Status: ON HOLD | COMMUNITY
End: 2024-04-17 | Stop reason: HOSPADM

## 2024-04-16 RX ORDER — SODIUM CHLORIDE, SODIUM LACTATE, POTASSIUM CHLORIDE, AND CALCIUM CHLORIDE .6; .31; .03; .02 G/100ML; G/100ML; G/100ML; G/100ML
IRRIGANT IRRIGATION PRN
Status: DISCONTINUED | OUTPATIENT
Start: 2024-04-16 | End: 2024-04-16 | Stop reason: ALTCHOICE

## 2024-04-16 RX ORDER — HYDROMORPHONE HYDROCHLORIDE 1 MG/ML
INJECTION, SOLUTION INTRAMUSCULAR; INTRAVENOUS; SUBCUTANEOUS PRN
Status: DISCONTINUED | OUTPATIENT
Start: 2024-04-16 | End: 2024-04-16 | Stop reason: SDUPTHER

## 2024-04-16 RX ORDER — IBUPROFEN 400 MG/1
800 TABLET ORAL EVERY 8 HOURS
Status: DISCONTINUED | OUTPATIENT
Start: 2024-04-17 | End: 2024-04-17 | Stop reason: HOSPADM

## 2024-04-16 RX ORDER — DEXAMETHASONE SODIUM PHOSPHATE 4 MG/ML
INJECTION, SOLUTION INTRA-ARTICULAR; INTRALESIONAL; INTRAMUSCULAR; INTRAVENOUS; SOFT TISSUE PRN
Status: DISCONTINUED | OUTPATIENT
Start: 2024-04-16 | End: 2024-04-16 | Stop reason: SDUPTHER

## 2024-04-16 RX ORDER — ONDANSETRON 4 MG/1
4 TABLET, ORALLY DISINTEGRATING ORAL EVERY 8 HOURS PRN
Status: DISCONTINUED | OUTPATIENT
Start: 2024-04-16 | End: 2024-04-17 | Stop reason: HOSPADM

## 2024-04-16 RX ORDER — SODIUM CHLORIDE 0.9 % (FLUSH) 0.9 %
5-40 SYRINGE (ML) INJECTION PRN
Status: DISCONTINUED | OUTPATIENT
Start: 2024-04-16 | End: 2024-04-17 | Stop reason: HOSPADM

## 2024-04-16 RX ORDER — FENTANYL CITRATE 50 UG/ML
25 INJECTION, SOLUTION INTRAMUSCULAR; INTRAVENOUS EVERY 5 MIN PRN
Status: DISCONTINUED | OUTPATIENT
Start: 2024-04-16 | End: 2024-04-16 | Stop reason: HOSPADM

## 2024-04-16 RX ORDER — NALOXONE HYDROCHLORIDE 0.4 MG/ML
INJECTION, SOLUTION INTRAMUSCULAR; INTRAVENOUS; SUBCUTANEOUS PRN
Status: DISCONTINUED | OUTPATIENT
Start: 2024-04-16 | End: 2024-04-16 | Stop reason: HOSPADM

## 2024-04-16 RX ORDER — ONDANSETRON 2 MG/ML
INJECTION INTRAMUSCULAR; INTRAVENOUS PRN
Status: DISCONTINUED | OUTPATIENT
Start: 2024-04-16 | End: 2024-04-16 | Stop reason: SDUPTHER

## 2024-04-16 RX ORDER — MAGNESIUM HYDROXIDE 1200 MG/15ML
LIQUID ORAL CONTINUOUS PRN
Status: COMPLETED | OUTPATIENT
Start: 2024-04-16 | End: 2024-04-16

## 2024-04-16 RX ORDER — KETOROLAC TROMETHAMINE 30 MG/ML
30 INJECTION, SOLUTION INTRAMUSCULAR; INTRAVENOUS EVERY 6 HOURS
Status: DISCONTINUED | OUTPATIENT
Start: 2024-04-16 | End: 2024-04-17 | Stop reason: HOSPADM

## 2024-04-16 RX ORDER — HYDROMORPHONE HYDROCHLORIDE 1 MG/ML
0.5 INJECTION, SOLUTION INTRAMUSCULAR; INTRAVENOUS; SUBCUTANEOUS EVERY 5 MIN PRN
Status: DISCONTINUED | OUTPATIENT
Start: 2024-04-16 | End: 2024-04-16 | Stop reason: HOSPADM

## 2024-04-16 RX ADMIN — SODIUM CHLORIDE, SODIUM LACTATE, POTASSIUM CHLORIDE, AND CALCIUM CHLORIDE: 600; 310; 30; 20 INJECTION, SOLUTION INTRAVENOUS at 17:25

## 2024-04-16 RX ADMIN — HYDROMORPHONE HYDROCHLORIDE 0.5 MG: 1 INJECTION, SOLUTION INTRAMUSCULAR; INTRAVENOUS; SUBCUTANEOUS at 12:43

## 2024-04-16 RX ADMIN — KETOROLAC TROMETHAMINE 30 MG: 15 INJECTION, SOLUTION INTRAMUSCULAR; INTRAVENOUS at 15:02

## 2024-04-16 RX ADMIN — METHYLENE BLUE 50 MG: 10 INJECTION INTRAVENOUS at 14:53

## 2024-04-16 RX ADMIN — LIDOCAINE HYDROCHLORIDE 80 MG: 20 INJECTION, SOLUTION EPIDURAL; INFILTRATION; INTRACAUDAL; PERINEURAL at 12:51

## 2024-04-16 RX ADMIN — SUGAMMADEX 200 MG: 100 INJECTION, SOLUTION INTRAVENOUS at 15:07

## 2024-04-16 RX ADMIN — WATER 2000 MG: 1 INJECTION INTRAMUSCULAR; INTRAVENOUS; SUBCUTANEOUS at 12:51

## 2024-04-16 RX ADMIN — FENTANYL CITRATE 50 MCG: 50 INJECTION, SOLUTION INTRAMUSCULAR; INTRAVENOUS at 12:52

## 2024-04-16 RX ADMIN — SODIUM CHLORIDE, SODIUM LACTATE, POTASSIUM CHLORIDE, AND CALCIUM CHLORIDE: 600; 310; 30; 20 INJECTION, SOLUTION INTRAVENOUS at 11:41

## 2024-04-16 RX ADMIN — KETOROLAC TROMETHAMINE 30 MG: 30 INJECTION, SOLUTION INTRAMUSCULAR; INTRAVENOUS at 22:26

## 2024-04-16 RX ADMIN — DEXAMETHASONE SODIUM PHOSPHATE 4 MG: 4 INJECTION, SOLUTION INTRAMUSCULAR; INTRAVENOUS at 12:55

## 2024-04-16 RX ADMIN — ONDANSETRON HYDROCHLORIDE 4 MG: 2 INJECTION INTRAMUSCULAR; INTRAVENOUS at 14:55

## 2024-04-16 RX ADMIN — FENTANYL CITRATE 50 MCG: 50 INJECTION, SOLUTION INTRAMUSCULAR; INTRAVENOUS at 13:20

## 2024-04-16 RX ADMIN — ROCURONIUM BROMIDE 50 MG: 10 INJECTION, SOLUTION INTRAVENOUS at 13:00

## 2024-04-16 RX ADMIN — FENTANYL CITRATE 25 MCG: 50 INJECTION INTRAMUSCULAR; INTRAVENOUS at 15:49

## 2024-04-16 RX ADMIN — ROCURONIUM BROMIDE 20 MG: 10 INJECTION, SOLUTION INTRAVENOUS at 14:05

## 2024-04-16 RX ADMIN — Medication 100 MG: at 12:52

## 2024-04-16 RX ADMIN — SODIUM CHLORIDE, SODIUM LACTATE, POTASSIUM CHLORIDE, AND CALCIUM CHLORIDE: 600; 310; 30; 20 INJECTION, SOLUTION INTRAVENOUS at 13:45

## 2024-04-16 RX ADMIN — HYDROMORPHONE HYDROCHLORIDE 0.5 MG: 1 INJECTION, SOLUTION INTRAMUSCULAR; INTRAVENOUS; SUBCUTANEOUS at 15:10

## 2024-04-16 RX ADMIN — SODIUM CHLORIDE, PRESERVATIVE FREE 10 ML: 5 INJECTION INTRAVENOUS at 22:27

## 2024-04-16 RX ADMIN — MIDAZOLAM 2 MG: 1 INJECTION INTRAMUSCULAR; INTRAVENOUS at 12:43

## 2024-04-16 RX ADMIN — DOCUSATE SODIUM 100 MG: 100 CAPSULE, LIQUID FILLED ORAL at 22:26

## 2024-04-16 RX ADMIN — PROPOFOL 150 MG: 10 INJECTION, EMULSION INTRAVENOUS at 12:52

## 2024-04-16 ASSESSMENT — PAIN DESCRIPTION - FREQUENCY
FREQUENCY: INTERMITTENT
FREQUENCY: CONTINUOUS
FREQUENCY: INTERMITTENT
FREQUENCY: CONTINUOUS

## 2024-04-16 ASSESSMENT — PAIN DESCRIPTION - DESCRIPTORS
DESCRIPTORS: ACHING
DESCRIPTORS: SHARP
DESCRIPTORS: ACHING
DESCRIPTORS: OTHER (COMMENT)
DESCRIPTORS: ACHING
DESCRIPTORS: STABBING
DESCRIPTORS: STABBING
DESCRIPTORS: ACHING
DESCRIPTORS: ACHING

## 2024-04-16 ASSESSMENT — PAIN DESCRIPTION - ORIENTATION
ORIENTATION: LOWER
ORIENTATION: LOWER
ORIENTATION: UPPER
ORIENTATION: LOWER
ORIENTATION: UPPER;LEFT
ORIENTATION: UPPER;LEFT

## 2024-04-16 ASSESSMENT — PAIN DESCRIPTION - LOCATION
LOCATION: ABDOMEN
LOCATION: ABDOMEN;VAGINA
LOCATION: ABDOMEN;VAGINA
LOCATION: ABDOMEN;SHOULDER
LOCATION: ABDOMEN
LOCATION: ABDOMEN;VAGINA
LOCATION: ABDOMEN;SHOULDER
LOCATION: ABDOMEN
LOCATION: ABDOMEN;VAGINA
LOCATION: ABDOMEN;VAGINA

## 2024-04-16 ASSESSMENT — PAIN DESCRIPTION - ONSET
ONSET: ON-GOING

## 2024-04-16 ASSESSMENT — PAIN SCALES - GENERAL
PAINLEVEL_OUTOF10: 5
PAINLEVEL_OUTOF10: 7
PAINLEVEL_OUTOF10: 6
PAINLEVEL_OUTOF10: 4
PAINLEVEL_OUTOF10: 4
PAINLEVEL_OUTOF10: 0
PAINLEVEL_OUTOF10: 4
PAINLEVEL_OUTOF10: 4
PAINLEVEL_OUTOF10: 5
PAINLEVEL_OUTOF10: 3
PAINLEVEL_OUTOF10: 4

## 2024-04-16 ASSESSMENT — PAIN DESCRIPTION - PAIN TYPE
TYPE: SURGICAL PAIN

## 2024-04-16 ASSESSMENT — PAIN - FUNCTIONAL ASSESSMENT
PAIN_FUNCTIONAL_ASSESSMENT: ACTIVITIES ARE NOT PREVENTED
PAIN_FUNCTIONAL_ASSESSMENT: ACTIVITIES ARE NOT PREVENTED

## 2024-04-16 NOTE — BRIEF OP NOTE
Brief Postoperative Note      Patient: Andrei Angel  YOB: 1985  MRN: [id number]    Date of Procedure: 4/16/2024    Pre-Op Diagnosis Codes:     * Abnormal uterine bleeding due to endometrial polyp [N93.9, N84.0]    Post-Op Diagnosis: Same, adenomyosis       Procedure(s):  LAPAROSCOPICALLY ASSISTED VAGINAL HYSTERECTOMY, CYSTOSCOPY, BILATERAL SALPINGECTOMY OP 23    Surgeon(s):  Geo Scott MD Ouyang, Lee, MD    Assistant:  * No surgical staff found *    Anesthesia: General    Estimated Blood Loss (mL): 150 ml    Complications: None    Specimens:   ID Type Source Tests Collected by Time Destination   A : UTERUS, CERVIX, BILATERAL FALLOPIAN TUBES Tissue Uterus SURGICAL PATHOLOGY Geo Scott MD 4/16/2024 1443        Implants:  * No implants in log *      Drains:   Urinary Catheter 04/16/24 2 Way (Active)   $ Urethral catheter insertion Inserted for procedure 04/16/24 1519   Urine Color Yellow 04/16/24 1519   Urine Appearance Clear 04/16/24 1519   Collection Container Standard 04/16/24 1519   Catheter Best Practices  Drainage tube clipped to bed;Bag not on floor;Lack of dependent loop in tubing;Catheter secured to thigh;Drainage bag less than half full;Tamper seal intact;Bag below bladder 04/16/24 1519   Status Draining 04/16/24 1519   Output (mL) 200 mL 04/16/24 1519   Discontinuation Reason Per provider order 04/16/24 1519       [REMOVED] NG/OG/NJ/NE Tube Orogastric 18 fr Center mouth (Removed)       Findings:  Infection Present At Time Of Surgery (PATOS) (choose all levels that have infection present):  No infection present  Other Findings: None    Electronically signed by GEO BRAUN MD on 4/16/2024 at 3:25 PM

## 2024-04-16 NOTE — PROGRESS NOTES
Bedside and Verbal shift change report given to Ramila RN (oncoming nurse) by Nikki RN (offgoing nurse). Report included the following information Nurse Handoff Report, Adult Overview, Intake/Output, and MAR.

## 2024-04-16 NOTE — ANESTHESIA PRE PROCEDURE
04/09/24 72.6 kg (160 lb)   04/26/23 68.9 kg (152 lb)     Body mass index is 26.91 kg/m².    CBC:   Lab Results   Component Value Date/Time    WBC 5.0 04/10/2024 08:34 AM    RBC 4.66 04/10/2024 08:34 AM    HGB 11.8 04/10/2024 08:34 AM    HCT 39.3 04/10/2024 08:34 AM    MCV 84.3 04/10/2024 08:34 AM    RDW 15.4 04/10/2024 08:34 AM     04/10/2024 08:34 AM       CMP: No results found for: \"NA\", \"K\", \"CL\", \"CO2\", \"BUN\", \"CREATININE\", \"GFRAA\", \"AGRATIO\", \"LABGLOM\", \"GLUCOSE\", \"GLU\", \"PROT\", \"CALCIUM\", \"BILITOT\", \"ALKPHOS\", \"AST\", \"ALT\"    POC Tests: No results for input(s): \"POCGLU\", \"POCNA\", \"POCK\", \"POCCL\", \"POCBUN\", \"POCHEMO\", \"POCHCT\" in the last 72 hours.    Coags: No results found for: \"PROTIME\", \"INR\", \"APTT\"    HCG (If Applicable):   Lab Results   Component Value Date    PREGTESTUR Negative 04/16/2024        ABGs: No results found for: \"PHART\", \"PO2ART\", \"ZAM8NGH\", \"BDA2DRC\", \"BEART\", \"A7YEJDFR\"     Type & Screen (If Applicable):  No results found for: \"LABABO\", \"LABRH\"    Drug/Infectious Status (If Applicable):  No results found for: \"HIV\", \"HEPCAB\"    COVID-19 Screening (If Applicable): No results found for: \"COVID19\"        Anesthesia Evaluation  Patient summary reviewed and Nursing notes reviewed   history of anesthetic complications (scop patch; injury to oral mucosal after last intubation): PONV.  Airway: Mallampati: II  TM distance: >3 FB   Neck ROM: full  Mouth opening: > = 3 FB   Dental: normal exam         Pulmonary:Negative Pulmonary ROS and normal exam                               Cardiovascular:Negative CV ROS            Rhythm: regular  Rate: normal                    Neuro/Psych:   (+) headaches:, psychiatric history: stable without treatment            GI/Hepatic/Renal: Neg GI/Hepatic/Renal ROS            Endo/Other: Negative Endo/Other ROS                    Abdominal:             Vascular:          Other Findings:             Anesthesia Plan      general     ASA 1       Induction:

## 2024-04-16 NOTE — PERIOP NOTE
Reviewed PTA medication list with patient/caregiver and patient/caregiver denies any additional medications.     Patient admits to having a responsible adult care for them at home for at least 24 hours after surgery.    Patient encouraged to use gown warming system and informed that using said warming gown to regulate body temperature prior to a procedure has been shown to help reduce the risks of blood clots and infection.    Patient's pharmacy of choice verified and documented in PTA medication section.    Dual skin assessment & fall risk band verification completed with PABLITO Andrade RN.

## 2024-04-16 NOTE — PERIOP NOTE
TRANSFER - OUT REPORT:    Verbal report given to JUAN CARLOS Jordan on Andrei Angel  being transferred to [facility] for routine post-op       Report consisted of patient's Situation, Background, Assessment and   Recommendations(SBAR).     Information from the following report(s) Adult Overview, Surgery Report, Intake/Output, and MAR was reviewed with the receiving nurse.           Lines:   Peripheral IV 04/16/24 Left;Posterior Hand (Active)   Site Assessment Clean, dry & intact 04/16/24 1620   Line Status Infusing 04/16/24 1620   Line Care Connections checked and tightened 04/16/24 1620   Phlebitis Assessment No symptoms 04/16/24 1620   Infiltration Assessment 0 04/16/24 1620   Alcohol Cap Used No 04/16/24 1620   Dressing Status Clean, dry & intact 04/16/24 1620   Dressing Type Transparent 04/16/24 1620   Dressing Intervention New 04/16/24 1559        Opportunity for questions and clarification was provided.      Patient transported with:  Registered Nurse

## 2024-04-16 NOTE — ANESTHESIA POSTPROCEDURE EVALUATION
.Post-Anesthesia Evaluation & Assessment    Visit Vitals  /67   Pulse 75   Temp 98.8 °F (37.1 °C) (Temporal)   Resp 15   Ht 1.626 m (5' 4\")   Wt 71.1 kg (156 lb 12.8 oz)   SpO2 100%   BMI 26.91 kg/m²       Nausea/Vomiting: no nausea    Post-operative hydration adequate.    Pain score (VAS): 0    Mental status & Level of consciousness: alert and oriented x 3    Neurological status: moves all extremities, sensation grossly intact    Pulmonary status: airway patent, no supplemental oxygen required    Complications related to anesthesia: none    Additional comments:

## 2024-04-16 NOTE — INTERVAL H&P NOTE
Update History & Physical    The patient's History and Physical of April 11, 2024 was reviewed with the patient and I examined the patient. There was no change. The surgical site was confirmed by the patient and me.     Plan: The risks, benefits, expected outcome, and alternative to the recommended procedure have been discussed with the patient. Patient understands and wants to proceed with the procedure.     Electronically signed by GEO BRAUN MD on 4/16/2024 at 12:28 PM

## 2024-04-16 NOTE — PROGRESS NOTES
TRANSFER - IN REPORT:    Verbal report received from CRNA,NURSE on Andrei Angel  being received from OR for routine post-op      Report consisted of patient's Situation, Background, Assessment and   Recommendations(SBAR).     Information from the following report(s) Nurse Handoff Report was reviewed with the receiving nurse.    Opportunity for questions and clarification was provided.      Assessment completed upon patient's arrival to unit and care assumed.

## 2024-04-17 VITALS
RESPIRATION RATE: 18 BRPM | DIASTOLIC BLOOD PRESSURE: 59 MMHG | HEART RATE: 65 BPM | HEIGHT: 64 IN | TEMPERATURE: 97.8 F | OXYGEN SATURATION: 100 % | SYSTOLIC BLOOD PRESSURE: 112 MMHG | WEIGHT: 175 LBS | BODY MASS INDEX: 29.88 KG/M2

## 2024-04-17 PROBLEM — Z98.890 S/P CYSTOSCOPY: Status: ACTIVE | Noted: 2024-04-17

## 2024-04-17 PROBLEM — Z90.79 STATUS POST BILATERAL SALPINGECTOMY: Status: ACTIVE | Noted: 2024-04-17

## 2024-04-17 PROBLEM — D64.9 ANEMIA: Status: ACTIVE | Noted: 2024-04-17

## 2024-04-17 PROBLEM — Z90.710 S/P LAPAROSCOPIC ASSISTED VAGINAL HYSTERECTOMY (LAVH): Status: ACTIVE | Noted: 2024-04-17

## 2024-04-17 LAB
ALBUMIN SERPL-MCNC: 2.8 G/DL (ref 3.4–5)
ALBUMIN/GLOB SERPL: 0.8 (ref 0.8–1.7)
ALP SERPL-CCNC: 81 U/L (ref 45–117)
ALT SERPL-CCNC: 20 U/L (ref 13–56)
ANION GAP SERPL CALC-SCNC: 6 MMOL/L (ref 3–18)
AST SERPL-CCNC: 17 U/L (ref 10–38)
BASOPHILS # BLD: 0 K/UL (ref 0–0.1)
BASOPHILS NFR BLD: 0 % (ref 0–2)
BILIRUB SERPL-MCNC: 0.5 MG/DL (ref 0.2–1)
BUN SERPL-MCNC: 10 MG/DL (ref 7–18)
BUN/CREAT SERPL: 18 (ref 12–20)
CALCIUM SERPL-MCNC: 8.2 MG/DL (ref 8.5–10.1)
CHLORIDE SERPL-SCNC: 106 MMOL/L (ref 100–111)
CO2 SERPL-SCNC: 24 MMOL/L (ref 21–32)
CREAT SERPL-MCNC: 0.57 MG/DL (ref 0.6–1.3)
DIFFERENTIAL METHOD BLD: ABNORMAL
EOSINOPHIL # BLD: 0 K/UL (ref 0–0.4)
EOSINOPHIL NFR BLD: 0 % (ref 0–5)
ERYTHROCYTE [DISTWIDTH] IN BLOOD BY AUTOMATED COUNT: 15.1 % (ref 11.6–14.5)
GLOBULIN SER CALC-MCNC: 3.4 G/DL (ref 2–4)
GLUCOSE SERPL-MCNC: 119 MG/DL (ref 74–99)
HCT VFR BLD AUTO: 33.5 % (ref 35–45)
HGB BLD-MCNC: 10.6 G/DL (ref 12–16)
IMM GRANULOCYTES # BLD AUTO: 0 K/UL (ref 0–0.04)
IMM GRANULOCYTES NFR BLD AUTO: 0 % (ref 0–0.5)
LYMPHOCYTES # BLD: 1.4 K/UL (ref 0.9–3.6)
LYMPHOCYTES NFR BLD: 13 % (ref 21–52)
MCH RBC QN AUTO: 26.2 PG (ref 24–34)
MCHC RBC AUTO-ENTMCNC: 31.6 G/DL (ref 31–37)
MCV RBC AUTO: 82.7 FL (ref 78–100)
MONOCYTES # BLD: 0.9 K/UL (ref 0.05–1.2)
MONOCYTES NFR BLD: 8 % (ref 3–10)
NEUTS SEG # BLD: 9 K/UL (ref 1.8–8)
NEUTS SEG NFR BLD: 79 % (ref 40–73)
NRBC # BLD: 0 K/UL (ref 0–0.01)
NRBC BLD-RTO: 0 PER 100 WBC
PLATELET # BLD AUTO: 183 K/UL (ref 135–420)
PMV BLD AUTO: 12.1 FL (ref 9.2–11.8)
POTASSIUM SERPL-SCNC: 4.2 MMOL/L (ref 3.5–5.5)
PROT SERPL-MCNC: 6.2 G/DL (ref 6.4–8.2)
RBC # BLD AUTO: 4.05 M/UL (ref 4.2–5.3)
SODIUM SERPL-SCNC: 136 MMOL/L (ref 136–145)
WBC # BLD AUTO: 11.4 K/UL (ref 4.6–13.2)

## 2024-04-17 PROCEDURE — 96374 THER/PROPH/DIAG INJ IV PUSH: CPT

## 2024-04-17 PROCEDURE — 2580000003 HC RX 258: Performed by: OBSTETRICS & GYNECOLOGY

## 2024-04-17 PROCEDURE — 6360000002 HC RX W HCPCS: Performed by: OBSTETRICS & GYNECOLOGY

## 2024-04-17 PROCEDURE — 96376 TX/PRO/DX INJ SAME DRUG ADON: CPT

## 2024-04-17 PROCEDURE — G0378 HOSPITAL OBSERVATION PER HR: HCPCS

## 2024-04-17 PROCEDURE — 80053 COMPREHEN METABOLIC PANEL: CPT

## 2024-04-17 PROCEDURE — 36415 COLL VENOUS BLD VENIPUNCTURE: CPT

## 2024-04-17 PROCEDURE — 85025 COMPLETE CBC W/AUTO DIFF WBC: CPT

## 2024-04-17 PROCEDURE — 6370000000 HC RX 637 (ALT 250 FOR IP): Performed by: OBSTETRICS & GYNECOLOGY

## 2024-04-17 RX ADMIN — KETOROLAC TROMETHAMINE 30 MG: 30 INJECTION, SOLUTION INTRAMUSCULAR; INTRAVENOUS at 11:19

## 2024-04-17 RX ADMIN — KETOROLAC TROMETHAMINE 30 MG: 30 INJECTION, SOLUTION INTRAMUSCULAR; INTRAVENOUS at 05:12

## 2024-04-17 RX ADMIN — SODIUM CHLORIDE, PRESERVATIVE FREE 10 ML: 5 INJECTION INTRAVENOUS at 08:26

## 2024-04-17 RX ADMIN — DOCUSATE SODIUM 100 MG: 100 CAPSULE, LIQUID FILLED ORAL at 08:25

## 2024-04-17 ASSESSMENT — PAIN SCALES - GENERAL
PAINLEVEL_OUTOF10: 3
PAINLEVEL_OUTOF10: 7

## 2024-04-17 ASSESSMENT — PAIN DESCRIPTION - LOCATION: LOCATION: ABDOMEN

## 2024-04-17 NOTE — CARE COORDINATION
D/C Plan: Home with physician follow up and spouse to drive    Patient reports she has plenty of help at home, can afford her medications, has a ride home and she has spoken with her surgeon concerning follow up in 2 weeks. No CM needs noted.      04/17/24 0911   Service Assessment   Patient Orientation Alert and Oriented   Cognition Alert   History Provided By Patient   Primary Caregiver Self   Support Systems Spouse/Significant Other   PCP Verified by CM Yes   Last Visit to PCP Within last 3 months   Prior Functional Level Independent in ADLs/IADLs   Current Functional Level Independent in ADLs/IADLs   Can patient return to prior living arrangement Yes   Ability to make needs known: Good   Social/Functional History   Lives With Spouse   Discharge Planning   Type of Residence House   Living Arrangements Spouse/Significant Other   Current Services Prior To Admission None   Potential Assistance Needed N/A   DME Ordered? No   Type of Home Care Services None   Patient expects to be discharged to: House   Services At/After Discharge   Transition of Care Consult (CM Consult) N/A   Services At/After Discharge None   Mode of Transport at Discharge Other (see comment)  (family)   Confirm Follow Up Transport Family   Condition of Participation: Discharge Planning   The Plan for Transition of Care is related to the following treatment goals: \"I will go home and follow up with my physician in 2 weeks. My  will drive me home.\"   The Patient and/or Patient Representative was provided with a Choice of Provider? Patient   The Patient and/Or Patient Representative agree with the Discharge Plan? Yes   Freedom of Choice list was provided with basic dialogue that supports the patient's individualized plan of care/goals, treatment preferences, and shares the quality data associated with the providers?  Yes

## 2024-04-17 NOTE — PLAN OF CARE
Problem: Pain  Goal: Verbalizes/displays adequate comfort level or baseline comfort level  Outcome: Adequate for Discharge     Problem: ABCDS Injury Assessment  Goal: Absence of physical injury  Outcome: Adequate for Discharge     Problem: Safety - Adult  Goal: Free from fall injury  4/17/2024 1019 by Halina Long, RN  Outcome: Adequate for Discharge  4/17/2024 1017 by Halina Long, RN  Outcome: Progressing

## 2024-04-17 NOTE — PROGRESS NOTES
0730  Bedside and Verbal shift change report given to Allegra. RN (oncoming nurse) Report included the following information Nurse Handoff Report, Adult Overview, Surgery Report, Intake/Output, MAR, and Recent Results.

## 2024-04-17 NOTE — OP NOTE
[address]  [address]                            OPERATIVE REPORT      PATIENT NAME: LILIA FERNANDEZ               : 1985  MED REC NO: [id number]                       ROOM: [facility]  ACCOUNT NO: [id number]                       ADMIT DATE: 2024  PROVIDER: Kassy Scott MD    DATE OF SERVICE:      PREOPERATIVE DIAGNOSES:  Abnormal uterine bleeding and endometrial polyp.    POSTOPERATIVE DIAGNOSES:  Abnormal uterine bleeding, endometrial polyp, and adenomyosis.    PROCEDURES PERFORMED:  Laparoscopically assisted total vaginal hysterectomy, bilateral salpingectomy, and cystoscopy.    SURGEON:  Kassy Scott MD    ASSISTANT:  Marc Meadows MD    ANESTHESIA:  General.    ESTIMATED BLOOD LOSS:  150 mL.    SPECIMENS REMOVED:  Uterus and cervix with bilateral fallopian tubes.    INTRAOPERATIVE FINDINGS:  Ten-week size globular uterus, which was consistent with adenomyosis.  There were normal tubes and ovaries bilaterally.  The upper abdomen and bowels appeared normal.  At the time of cystoscopy, the interior of the bladder appeared normal.  Both ureters were identified and spurting methylene blue dye.     COMPLICATIONS:  None.    IMPLANTS:  None    INDICATIONS:  Abnormal uterine bleeding.    DESCRIPTION OF PROCEDURE:  The patient was brought to the operating room and identified as the correct patient.  She received 2 g of intravenous Ancef on-call to the operating room.  She was placed in the supine position.  Sequential compression boots were placed on her legs and activated.  General anesthesia was then administered without difficulty.  The patient was placed in the dorsal lithotomy position in Nitish stirrups.  Her arms were tucked at her side.  She was sterilely prepped and draped in the usual manner.  A Llanes catheter was sterilely placed, which drained clear yellow urine.  Time-out was performed.  Examination under anesthesia

## 2024-04-17 NOTE — PROGRESS NOTES
Gynecology Post Operative Note    Andrei Angel    Assessment: 1 Day Post-Op     Plan: Continue routine post-op care  Advance diet  Ambulate  Discharge home today with: Medications: prior to admission, has rx for meperidine at home  Follow up: 2 weeks Diet: as tolerated Activity: as tolerated, no heavy lifting, and pelvic rest  Needs to void prior to discharge. Joceline JIMENEZ if patient not voiding.     Procedure: Procedure(s):  LAPAROSCOPICALLY ASSISTED VAGINAL HYSTERECTOMY, CYSTOSCOPY, BILATERAL SALPINGECTOMY OP 23    Subjective:  She is without significant complaints. Pain controlled on current medication.  Patient is passing flatus. She is is tolerating her diet. Llanes removed this morning by RN. Vaginal packing was not removed (order was placed for removal at 0500 by surgeon), I removed packing with RN as chaperone.     Objective:  Vitals:  /77   Pulse 76   Temp 97.9 °F (36.6 °C) (Axillary)   Resp 18   Ht 1.626 m (5' 4\")   Wt 79.4 kg (175 lb)   LMP 2024 (Approximate)   SpO2 99%   BMI 30.04 kg/m²   Temp (24hrs), Av.8 °F (36.6 °C), Min:97.3 °F (36.3 °C), Max:98.8 °F (37.1 °C)      Last 24hr Input/Output:    Intake/Output Summary (Last 24 hours) at 2024 0725  Last data filed at 2024 0525  Gross per 24 hour   Intake 1600 ml   Output 1400 ml   Net 200 ml          Exam:  Patient without distress.              Abdomen soft, expected tenderness.                Incision dry and clean without erythema.              Lower extremities are negative for swelling, cords, or tenderness.    Labs:   Lab Results   Component Value Date/Time    WBC 11.4 2024 03:06 AM    WBC 5.0 04/10/2024 08:34 AM    WBC 13.3 10/19/2019 06:05 AM    HGB 10.6 2024 03:06 AM    HGB 11.8 04/10/2024 08:34 AM    HGB 11.1 10/19/2019 06:05 AM    HCT 33.5 2024 03:06 AM    HCT 39.3 04/10/2024 08:34 AM    HCT 34.8 10/19/2019 06:05 AM     2024 03:06 AM     04/10/2024 08:34 AM

## (undated) DEVICE — GARMENT,MEDLINE,DVT,INT,CALF,MED, GEN2: Brand: MEDLINE

## (undated) DEVICE — PAD BD CONVOLUTED FOAM

## (undated) DEVICE — SHEET,DRAPE,40X58,STERILE: Brand: MEDLINE

## (undated) DEVICE — TROCAR: Brand: KII® OPTICAL ACCESS SYSTEM

## (undated) DEVICE — 3L THIN WALL CAN: Brand: CRD

## (undated) DEVICE — SET,IRRIGATION,CYSTO/TUR,90": Brand: MEDLINE

## (undated) DEVICE — REM POLYHESIVE ADULT PATIENT RETURN ELECTRODE: Brand: VALLEYLAB

## (undated) DEVICE — PAD PT POS 36 IN SURGYPAD DISP

## (undated) DEVICE — LAPAROSCOPIC TROCAR SLEEVE/SINGLE USE: Brand: KII® OPTICAL ACCESS SYSTEM

## (undated) DEVICE — GLOVE SURG SZ 65 THK91MIL LTX FREE SYN POLYISOPRENE

## (undated) DEVICE — SUTURE VICRYL + SZ 0 L27IN ABSRB VLT L26MM UR-6 5/8 CIR VCP603H

## (undated) DEVICE — TUBING, SUCTION, 1/4" X 12', STRAIGHT: Brand: MEDLINE

## (undated) DEVICE — FORCEPS BPLR DIA5MM MACRO JAW LAP ENDOPATH

## (undated) DEVICE — SUT VCRL + 2-0 36IN CT1 UD --

## (undated) DEVICE — INTENT TO BE USED WITH SUTURE MATERIAL FOR TISSUE CLOSURE: Brand: RICHARD-ALLAN® NEEDLE 1/2 CIRCLE TAPER

## (undated) DEVICE — HYPODERMIC SAFETY NEEDLE: Brand: MAGELLAN

## (undated) DEVICE — TROCAR: Brand: KII® SLEEVE

## (undated) DEVICE — SUTURE VICRYL SZ 0 L36IN ABSRB UD L36MM CT-1 1/2 CIR J946H

## (undated) DEVICE — YANKAUER,FLEXIBLE HANDLE,REGLR CAPACITY: Brand: MEDLINE INDUSTRIES, INC.

## (undated) DEVICE — BSMI CSECTION BIRTHING PACK: Brand: MEDLINE INDUSTRIES, INC.

## (undated) DEVICE — SUTURE VCRL SZ 1 L36IN ABSRB UD L36MM CTB-1 1/2 CIR BLNT JB947

## (undated) DEVICE — KIWI® VACUUM DELIVERY SYSTEM PROCUP®: Brand: KIWI® PROCUP®

## (undated) DEVICE — STAPLER SKIN SQ 30 ABSRB STPL DISP INSORB

## (undated) DEVICE — GLOVE SURG SZ 7 L12IN FNGR THK79MIL GRN LTX FREE

## (undated) DEVICE — POUCH, INSTRUMENT, 3POCKET, INVISISHIELD: Brand: MEDLINE

## (undated) DEVICE — TABLE COVER: Brand: CONVERTORS

## (undated) DEVICE — TROCAR: Brand: KII OPTICAL ACCESS SYSTEM

## (undated) DEVICE — STRAP,POSITIONING,KNEE/BODY,FOAM,4X60": Brand: MEDLINE

## (undated) DEVICE — FORCEPS ENDOSCP L25CM DIA5MM OPN CRV JAW W/ CRD LO PROF

## (undated) DEVICE — SOL IRRIGATION INJ NACL 0.9% 500ML BTL

## (undated) DEVICE — SET TBNG DISP TIP FOR AHTO

## (undated) DEVICE — PACKING GZ W2INXL6FT WVN COT VAG RADPQ

## (undated) DEVICE — LARGE, DISPOSABLE ALEXIS O C-SECTION PROTECTOR - RETRACTOR: Brand: ALEXIS ® O C-SECTION PROTECTOR - RETRACTOR

## (undated) DEVICE — TROCAR: Brand: KII SLEEVE

## (undated) DEVICE — SUTURE VICRYL + SZ 0 L18IN ABSRB UD L36MM CT-1 1/2 CIR VCP840D

## (undated) DEVICE — ROBOTIC PACK: Brand: MEDLINE INDUSTRIES, INC.

## (undated) DEVICE — HEX-LOCKING BLADE ELECTRODE: Brand: EDGE

## (undated) DEVICE — SUTURE MONOCRYL + SZ 4-0 L27IN ABSRB UD L19MM PS-2 3/8 CIR MCP426H

## (undated) DEVICE — PENCIL ES L3M ROCK SWCH S STL HEX LOK BLDE ELECTRD HOLSTER

## (undated) DEVICE — TOWEL,OR,DSP,ST,BLUE,STD,4/PK,20PK/CS: Brand: MEDLINE

## (undated) DEVICE — TROCARS: Brand: KII® BALLOON BLUNT TIP SYSTEM

## (undated) DEVICE — DERMABOND SKIN ADH 0.7ML -- DERMABOND ADVANCED 12/BX

## (undated) DEVICE — SOLUTION IRRIG 500ML 0.9% SOD CHLO USP POUR PLAS BTL

## (undated) DEVICE — SOLUTION ANTIFOG VIS SYS CLEARIFY LAPSCP

## (undated) DEVICE — SUTURE VICRYL + SZ 3-0 L36IN ABSRB UD L36MM CT-1 1/2 CIR VCP944H

## (undated) DEVICE — SHEARS ENDOSCP L23CM DIA5MM ULTRASONIC CRV TIP W/ ADV

## (undated) DEVICE — 1LYRTR 16FR10ML100%SIL UMS SNP: Brand: MEDLINE INDUSTRIES, INC.

## (undated) DEVICE — [HIGH FLOW INSUFFLATOR,  DO NOT USE IF PACKAGE IS DAMAGED,  KEEP DRY,  KEEP AWAY FROM SUNLIGHT,  PROTECT FROM HEAT AND RADIOACTIVE SOURCES.]: Brand: PNEUMOSURE

## (undated) DEVICE — INTENDED FOR TISSUE SEPARATION, AND OTHER PROCEDURES THAT REQUIRE A SHARP SURGICAL BLADE TO PUNCTURE OR CUT.: Brand: BARD-PARKER ® CARBON RIB-BACK BLADES

## (undated) DEVICE — LIQUIBAND RAPID ADHESIVE 36/CS 0.8ML: Brand: MEDLINE

## (undated) DEVICE — AGENT HEMSTAT 3GM PURIFIED PLNT STARCH PWD ABSRB ARISTA AH